# Patient Record
Sex: FEMALE | Race: WHITE | NOT HISPANIC OR LATINO | Employment: FULL TIME | URBAN - METROPOLITAN AREA
[De-identification: names, ages, dates, MRNs, and addresses within clinical notes are randomized per-mention and may not be internally consistent; named-entity substitution may affect disease eponyms.]

---

## 2024-03-09 ENCOUNTER — HOSPITAL ENCOUNTER (EMERGENCY)
Facility: HOSPITAL | Age: 49
Discharge: HOME | End: 2024-03-09
Attending: STUDENT IN AN ORGANIZED HEALTH CARE EDUCATION/TRAINING PROGRAM
Payer: COMMERCIAL

## 2024-03-09 VITALS
HEART RATE: 77 BPM | HEIGHT: 64 IN | WEIGHT: 269.84 LBS | OXYGEN SATURATION: 97 % | TEMPERATURE: 98.2 F | DIASTOLIC BLOOD PRESSURE: 101 MMHG | SYSTOLIC BLOOD PRESSURE: 191 MMHG | BODY MASS INDEX: 46.07 KG/M2 | RESPIRATION RATE: 18 BRPM

## 2024-03-09 DIAGNOSIS — M54.30 SCIATIC LEG PAIN: Primary | ICD-10-CM

## 2024-03-09 PROCEDURE — 99283 EMERGENCY DEPT VISIT LOW MDM: CPT

## 2024-03-09 PROCEDURE — 2500000002 HC RX 250 W HCPCS SELF ADMINISTERED DRUGS (ALT 637 FOR MEDICARE OP, ALT 636 FOR OP/ED): Performed by: STUDENT IN AN ORGANIZED HEALTH CARE EDUCATION/TRAINING PROGRAM

## 2024-03-09 PROCEDURE — 2500000004 HC RX 250 GENERAL PHARMACY W/ HCPCS (ALT 636 FOR OP/ED): Performed by: STUDENT IN AN ORGANIZED HEALTH CARE EDUCATION/TRAINING PROGRAM

## 2024-03-09 RX ORDER — PREDNISONE 50 MG/1
50 TABLET ORAL ONCE
Status: COMPLETED | OUTPATIENT
Start: 2024-03-09 | End: 2024-03-09

## 2024-03-09 RX ORDER — ORPHENADRINE CITRATE 100 MG/1
100 TABLET, EXTENDED RELEASE ORAL ONCE
Status: COMPLETED | OUTPATIENT
Start: 2024-03-09 | End: 2024-03-09

## 2024-03-09 RX ORDER — PREDNISONE 20 MG/1
20 TABLET ORAL DAILY
Qty: 3 TABLET | Refills: 0 | Status: SHIPPED | OUTPATIENT
Start: 2024-03-09 | End: 2024-03-12

## 2024-03-09 RX ORDER — ORPHENADRINE CITRATE 100 MG/1
100 TABLET, EXTENDED RELEASE ORAL 2 TIMES DAILY PRN
Qty: 6 TABLET | Refills: 0 | Status: SHIPPED | OUTPATIENT
Start: 2024-03-09

## 2024-03-09 RX ADMIN — ORPHENADRINE CITRATE 100 MG: 100 TABLET, EXTENDED RELEASE ORAL at 08:29

## 2024-03-09 RX ADMIN — PREDNISONE 50 MG: 50 TABLET ORAL at 08:29

## 2024-03-09 ASSESSMENT — PAIN DESCRIPTION - ORIENTATION: ORIENTATION: RIGHT

## 2024-03-09 ASSESSMENT — PAIN DESCRIPTION - PAIN TYPE: TYPE: ACUTE PAIN

## 2024-03-09 ASSESSMENT — COLUMBIA-SUICIDE SEVERITY RATING SCALE - C-SSRS
6. HAVE YOU EVER DONE ANYTHING, STARTED TO DO ANYTHING, OR PREPARED TO DO ANYTHING TO END YOUR LIFE?: NO
1. IN THE PAST MONTH, HAVE YOU WISHED YOU WERE DEAD OR WISHED YOU COULD GO TO SLEEP AND NOT WAKE UP?: NO
2. HAVE YOU ACTUALLY HAD ANY THOUGHTS OF KILLING YOURSELF?: NO

## 2024-03-09 ASSESSMENT — PAIN DESCRIPTION - LOCATION: LOCATION: HIP

## 2024-03-09 ASSESSMENT — PAIN SCALES - GENERAL: PAINLEVEL_OUTOF10: 10 - WORST POSSIBLE PAIN

## 2024-03-09 ASSESSMENT — PAIN DESCRIPTION - FREQUENCY: FREQUENCY: CONSTANT/CONTINUOUS

## 2024-03-09 ASSESSMENT — PAIN DESCRIPTION - DESCRIPTORS: DESCRIPTORS: STABBING;NUMBNESS

## 2024-03-09 ASSESSMENT — PAIN DESCRIPTION - ONSET: ONSET: SUDDEN

## 2024-03-09 ASSESSMENT — PAIN - FUNCTIONAL ASSESSMENT: PAIN_FUNCTIONAL_ASSESSMENT: 0-10

## 2024-03-09 ASSESSMENT — PAIN DESCRIPTION - PROGRESSION: CLINICAL_PROGRESSION: GRADUALLY WORSENING

## 2024-03-09 NOTE — ED PROVIDER NOTES
HPI   Chief Complaint   Patient presents with    Hip Pain     On Wednesday I slept without a pillow between my legs and I woke up with rt hip pain I feel stabbing and numbness in my knee nothing is helping       Patient presents with right buttock pain.  She reports that the pain has been ongoing for the last 3 to 4 days and getting worse.  No inciting injuries.  The pain radiates down the entire right leg and sometimes is stabbing and burning in sensation.  Nothing like this is happened previously.  Patient does report that she has known bulging disc in her low back.  She has history of high blood pressure but is otherwise healthy.  She has been using Tylenol and ibuprofen.  No loss of control of bowels or bladder.  No fevers.                          [place] Coma Scale Score: 15                     Patient History   Past Medical History:   Diagnosis Date    Tobacco abuse counseling 01/24/2013    Tobacco abuse counseling     No past surgical history on file.  No family history on file.  Social History     Tobacco Use    Smoking status: Not on file    Smokeless tobacco: Not on file   Substance Use Topics    Alcohol use: Not on file    Drug use: Not on file       Physical Exam   ED Triage Vitals [03/09/24 0813]   Temperature Heart Rate Respirations BP   36.8 °C (98.2 °F) 77 18 (!) 191/101      Pulse Ox Temp Source Heart Rate Source Patient Position   97 % Oral Monitor Sitting      BP Location FiO2 (%)     Left arm --       Physical Exam  HENT:      Head: Normocephalic.   Musculoskeletal:      Comments: No midline spinal tenderness to palpation.   Skin:     General: Skin is warm.   Neurological:      Mental Status: She is alert.      Comments: Strength of hip extension, flexion, abduction, adduction intact.  Strength of knee flexion and extension intact.  Strength of ankle flexion and extension intact.  Strength of toe plantar flexion and dorsiflexion intact.  Sensation intact in bilateral lower extremities including  inner thigh.           ED Course & MDM   Diagnoses as of 03/09/24 0826   Sciatic leg pain       Medical Decision Making  Patient presents with symptoms felt to be secondary to sciatica.  My suspicion for cauda equina syndrome, epidural abscess, epidural hematoma, spinal cord compression is very low.  Patient given prescription for steroid and muscle relaxer and was advised to continue using Tylenol and ibuprofen.  Patient advised to follow-up with primary care physician.  Return precautions given for any worsening symptoms.  Parts of this chart were completed with dictation software, please excuse any errors in transcription.        Procedure  Procedures     Terry Posada MD  03/09/24 0830

## 2024-03-09 NOTE — DISCHARGE INSTRUCTIONS
You have been given a prescription for a steroid which will help with inflammation of the nerve in your leg.  You have also been given a prescription for muscle relaxer which may help with spasms or pain.  You should not drive vehicle or operate machinery if using muscle relaxers.  You should continue to use Tylenol and ibuprofen.  You should remain as active as possible.  You should follow-up with your primary care physician.    It is important to remember that your care does not end here and you must continue to monitor your condition closely. Please return to the emergency department for any worsening or concerning signs or symptoms as directed by our conversations and the discharge instructions. If you do not have a doctor please contact the referral number on your discharge instructions. Please contact any physician specialists provided in your discharge notes as it is very important to follow up with them regarding your condition. If you are unable to reach the physicians provided, please come back to the Emergency Department at any time.    Return to emergency room without delay for ANY new or worsening pains or for any other symptoms or concerns.  Return with worsening pains, nausea, vomiting, trouble breathing, palpitations, shortness of breath, inability to pass stool or urine, loss of control of stool or urine, any numbness or tingling (that is not normal for you), uncontrolled fevers, the passing of blood or other material in stool or urine, rashes, pains or for any other symptoms or concerns you may have.  You are always welcome to return to the ER at any time for any reason or for any other concerns you may have.

## 2024-03-17 ENCOUNTER — HOSPITAL ENCOUNTER (EMERGENCY)
Facility: HOSPITAL | Age: 49
Discharge: HOME | End: 2024-03-17
Payer: COMMERCIAL

## 2024-03-17 ENCOUNTER — APPOINTMENT (OUTPATIENT)
Dept: RADIOLOGY | Facility: HOSPITAL | Age: 49
End: 2024-03-17
Payer: COMMERCIAL

## 2024-03-17 VITALS
HEIGHT: 64 IN | TEMPERATURE: 97.9 F | SYSTOLIC BLOOD PRESSURE: 167 MMHG | OXYGEN SATURATION: 96 % | HEART RATE: 78 BPM | DIASTOLIC BLOOD PRESSURE: 80 MMHG | BODY MASS INDEX: 46.18 KG/M2 | WEIGHT: 270.5 LBS | RESPIRATION RATE: 16 BRPM

## 2024-03-17 DIAGNOSIS — S92.501A CLOSED FRACTURE OF PHALANX OF RIGHT FOURTH TOE, INITIAL ENCOUNTER: ICD-10-CM

## 2024-03-17 DIAGNOSIS — S92.501A CLOSED FRACTURE OF FIFTH TOE OF RIGHT FOOT, INITIAL ENCOUNTER: Primary | ICD-10-CM

## 2024-03-17 PROCEDURE — 99283 EMERGENCY DEPT VISIT LOW MDM: CPT

## 2024-03-17 PROCEDURE — 2500000001 HC RX 250 WO HCPCS SELF ADMINISTERED DRUGS (ALT 637 FOR MEDICARE OP)

## 2024-03-17 PROCEDURE — 73630 X-RAY EXAM OF FOOT: CPT | Mod: RT

## 2024-03-17 PROCEDURE — 28515 TREATMENT OF TOE FRACTURE: CPT

## 2024-03-17 PROCEDURE — 73630 X-RAY EXAM OF FOOT: CPT | Mod: RIGHT SIDE | Performed by: RADIOLOGY

## 2024-03-17 RX ORDER — HYDROCODONE BITARTRATE AND ACETAMINOPHEN 5; 325 MG/1; MG/1
1 TABLET ORAL ONCE
Status: COMPLETED | OUTPATIENT
Start: 2024-03-17 | End: 2024-03-17

## 2024-03-17 RX ADMIN — HYDROCODONE BITARTRATE AND ACETAMINOPHEN 1 TABLET: 5; 325 TABLET ORAL at 20:42

## 2024-03-17 ASSESSMENT — PAIN DESCRIPTION - PROGRESSION: CLINICAL_PROGRESSION: NOT CHANGED

## 2024-03-17 ASSESSMENT — PAIN - FUNCTIONAL ASSESSMENT: PAIN_FUNCTIONAL_ASSESSMENT: 0-10

## 2024-03-17 ASSESSMENT — PAIN DESCRIPTION - ORIENTATION: ORIENTATION: RIGHT

## 2024-03-17 ASSESSMENT — COLUMBIA-SUICIDE SEVERITY RATING SCALE - C-SSRS
6. HAVE YOU EVER DONE ANYTHING, STARTED TO DO ANYTHING, OR PREPARED TO DO ANYTHING TO END YOUR LIFE?: NO
2. HAVE YOU ACTUALLY HAD ANY THOUGHTS OF KILLING YOURSELF?: NO
1. IN THE PAST MONTH, HAVE YOU WISHED YOU WERE DEAD OR WISHED YOU COULD GO TO SLEEP AND NOT WAKE UP?: NO

## 2024-03-17 ASSESSMENT — PAIN DESCRIPTION - ONSET: ONSET: SUDDEN

## 2024-03-17 ASSESSMENT — PAIN DESCRIPTION - FREQUENCY: FREQUENCY: CONSTANT/CONTINUOUS

## 2024-03-17 ASSESSMENT — PAIN DESCRIPTION - DESCRIPTORS: DESCRIPTORS: BURNING

## 2024-03-17 ASSESSMENT — PAIN DESCRIPTION - LOCATION: LOCATION: TOE (COMMENT WHICH ONE)

## 2024-03-17 ASSESSMENT — PAIN DESCRIPTION - PAIN TYPE: TYPE: ACUTE PAIN

## 2024-03-17 ASSESSMENT — PAIN SCALES - GENERAL: PAINLEVEL_OUTOF10: 9

## 2024-03-17 NOTE — Clinical Note
Estephania De was seen and treated in our emergency department on 3/17/2024.  She may return to work on 03/20/2024.  Patient to have seated job as necessary to help with healing of fracture.     If you have any questions or concerns, please don't hesitate to call.      Solange Spence PA-C

## 2024-03-18 NOTE — ED PROVIDER NOTES
HPI   Chief Complaint   Patient presents with    Toe Injury     Pt was walking at home and hit her right little toe against a door jam. Pt has deformity to toe.       Patient is a 48-year-old female presents emergency department for evaluation of right pinky toe injury.  Patient states that she was walking when she accidentally stubbed her pinky toe on a door jam.  She states all the pain is in the right pinky toe and there is an obvious deformity.  She has pain and tingling in the pinky toe, but denies any other injuries to the rest of the foot.  She has been walking on it, but has pain only in the pinky toe of the right foot.  She denies any pain in the foot or leg.  She denies any other injuries at this time.      History provided by:  Patient   used: No                        No data recorded                   Patient History   Past Medical History:   Diagnosis Date    Tobacco abuse counseling 01/24/2013    Tobacco abuse counseling     No past surgical history on file.  No family history on file.  Social History     Tobacco Use    Smoking status: Not on file    Smokeless tobacco: Not on file   Substance Use Topics    Alcohol use: Not on file    Drug use: Not on file       Physical Exam   ED Triage Vitals [03/17/24 2006]   Temperature Heart Rate Respirations BP   36.6 °C (97.9 °F) 78 16 167/80      Pulse Ox Temp Source Heart Rate Source Patient Position   96 % Temporal Monitor Sitting      BP Location FiO2 (%)     Right arm --       Physical Exam  Constitutional:       Appearance: Normal appearance.   Cardiovascular:      Rate and Rhythm: Normal rate and regular rhythm.   Pulmonary:      Effort: Pulmonary effort is normal.      Breath sounds: Normal breath sounds.   Musculoskeletal:         General: Tenderness and signs of injury present.      Comments: Tenderness to palpation over fourth and fifth toes of right foot with obvious deformity to fifth pinky with lateral angulation.  Decreased  range of motion to fourth and fifth toes because of pain.  Good capillary refill to all digits of right foot with no tenderness to palpation over metatarsals or ankle.   Skin:     General: Skin is warm and dry.   Neurological:      General: No focal deficit present.      Mental Status: She is alert and oriented to person, place, and time.         ED Course & MDM   Diagnoses as of 03/17/24 2150   Closed fracture of fifth toe of right foot, initial encounter   Closed fracture of phalanx of right fourth toe, initial encounter       Medical Decision Making  Patient is a 48-year-old female presents emergency department for evaluation of injury to right pinky and fourth toe.    Lab work not warranted at today's visit     scans done today were interpreted/confirmed by radiologist and also interpreted by me which included x-ray right foot.  X-ray shows oblique laterally angulated mildly displaced fracture of the proximal fifth phalanx with no intra-articular extension with possible transverse nondisplaced fracture of distal aspect of fourth proximal phalanx without evidence for intra-articular extension.    Medications given at today's visit include PO [place]    I saw this patient independently.  X-rays show evidence of fracture to both fourth and fifth proximal phalanx to right foot.  Fourth toe is nondisplaced with fifth toe showing some lateral angulation.  Because of the angulation and deformity, I did inject toe with lidocaine 1% and performed a reduction and placed the toe in better alignment.  Fifth phalanx was neurovascular intact following reduction and had better external alignment and was ally taped to the adjoining 4 toes.  She was placed in postop shoe by me and neurovascular intact following this.  She was educated on continued symptom management and close follow-up with podiatry for continued management of these fractures.  Patient agreeable to plan moving forward and agreeable to discharge at this time.   Emergent pathologies were considered for this patient, although I have low suspicion for anything acutely emergent given patient's clinical presentation, history, physical exam, stable vital signs, and relatively unremarkable workup.  Discharging patient home is reasonable plan of care for outpatient management.    All labs, imaging, and diagnostic studies were reviewed by me and patient was counseled on clinical impression, expectations, and plan.  Patient was educated to follow-up with PCP in the following 1-2 days.  All questions from patient were answered. They elicited understanding and were agreeable to course of treatment.  Patient was discharged in stable condition and given strict return precautions.    ** Disclaimer:  Parts of this document were written utilizing a voice to text dictation software.  Note may contain minor transcription or typographical errors that were inadvertently transcribed by the computer software.        Procedure  Orthopaedic Injury Treatment - Fracture    Performed by: Solange Spence PA-C  Authorized by: Solange Spence PA-C    Consent:     Consent obtained:  Verbal    Consent given by:  Patient    Risks, benefits, and alternatives were discussed: yes    Universal protocol:     Patient identity confirmed:  Verbally with patient  Injury:     Injury location:  Toe    Toe injury location:  R fifth toe    Toe fracture type: proximal phalanx    Pre-procedure details:     Distal neurologic exam:  Normal    Distal perfusion: brisk capillary refill    Sedation:     Sedation type:  None  Anesthesia:     Anesthesia method:  Local infiltration    Local anesthetic:  Lidocaine 1% w/o epi  Procedure details:     Manipulation performed: yes      Reduction successful: yes      X-ray confirmed reduction: no      Immobilization:  Tape    Additional details:  Visualized appropriate alignment of fifth toe with no longer having lateral angulation.  Post-procedure details:     Distal neurologic exam:   Normal    Distal perfusion: brisk capillary refill      Range of motion: improved      Procedure completion:  Tolerated well, no immediate complications       Solange Spence PA-C  03/17/24 1297

## 2024-03-18 NOTE — ED NOTES
Post op shoe applied to right foot. MPS intact. Patient denies pain at this time.      Kimberley Rothman RN  03/17/24 1773

## 2024-03-18 NOTE — DISCHARGE INSTRUCTIONS
Keep toes buddy taped and continue wearing postop shoe to follow-up closely with foot specialist as soon as possible.    Continue Tylenol and ibuprofen as needed for aches and pains.  Keep foot elevated and continue icing over the next 24 to 48 hours to help with swelling and inflammation.    Follow close with primary care provider in the following 1 to 2 days.

## 2024-06-16 ENCOUNTER — APPOINTMENT (OUTPATIENT)
Dept: RADIOLOGY | Facility: HOSPITAL | Age: 49
End: 2024-06-16
Payer: COMMERCIAL

## 2024-06-16 ENCOUNTER — HOSPITAL ENCOUNTER (OUTPATIENT)
Facility: HOSPITAL | Age: 49
Setting detail: OBSERVATION
Discharge: HOME | End: 2024-06-17
Attending: EMERGENCY MEDICINE | Admitting: INTERNAL MEDICINE
Payer: COMMERCIAL

## 2024-06-16 ENCOUNTER — APPOINTMENT (OUTPATIENT)
Dept: CARDIOLOGY | Facility: HOSPITAL | Age: 49
End: 2024-06-16
Payer: COMMERCIAL

## 2024-06-16 DIAGNOSIS — R27.0 ATAXIA: ICD-10-CM

## 2024-06-16 DIAGNOSIS — R42 VERTIGO: ICD-10-CM

## 2024-06-16 DIAGNOSIS — R11.2 NAUSEA AND VOMITING, UNSPECIFIED VOMITING TYPE: Primary | ICD-10-CM

## 2024-06-16 PROBLEM — I10 HTN (HYPERTENSION): Status: ACTIVE | Noted: 2024-06-16

## 2024-06-16 LAB
ALBUMIN SERPL-MCNC: 4.5 G/DL (ref 3.5–5)
ALP BLD-CCNC: 64 U/L (ref 35–125)
ALT SERPL-CCNC: 23 U/L (ref 5–40)
ANION GAP SERPL CALC-SCNC: 12 MMOL/L
APPEARANCE UR: CLEAR
AST SERPL-CCNC: 22 U/L (ref 5–40)
BASOPHILS # BLD AUTO: 0.05 X10*3/UL (ref 0–0.1)
BASOPHILS NFR BLD AUTO: 0.5 %
BILIRUB SERPL-MCNC: <0.2 MG/DL (ref 0.1–1.2)
BILIRUB UR STRIP.AUTO-MCNC: NEGATIVE MG/DL
BUN SERPL-MCNC: 13 MG/DL (ref 8–25)
CALCIUM SERPL-MCNC: 9.5 MG/DL (ref 8.5–10.4)
CHLORIDE SERPL-SCNC: 102 MMOL/L (ref 97–107)
CO2 SERPL-SCNC: 24 MMOL/L (ref 24–31)
COLOR UR: COLORLESS
CREAT SERPL-MCNC: 0.7 MG/DL (ref 0.4–1.6)
EGFRCR SERPLBLD CKD-EPI 2021: >90 ML/MIN/1.73M*2
EOSINOPHIL # BLD AUTO: 0.13 X10*3/UL (ref 0–0.7)
EOSINOPHIL NFR BLD AUTO: 1.2 %
ERYTHROCYTE [DISTWIDTH] IN BLOOD BY AUTOMATED COUNT: 12.5 % (ref 11.5–14.5)
GLUCOSE SERPL-MCNC: 128 MG/DL (ref 65–99)
GLUCOSE UR STRIP.AUTO-MCNC: NORMAL MG/DL
HCG UR QL IA.RAPID: NEGATIVE
HCT VFR BLD AUTO: 42.6 % (ref 36–46)
HGB BLD-MCNC: 14 G/DL (ref 12–16)
IMM GRANULOCYTES # BLD AUTO: 0.03 X10*3/UL (ref 0–0.7)
IMM GRANULOCYTES NFR BLD AUTO: 0.3 % (ref 0–0.9)
KETONES UR STRIP.AUTO-MCNC: NEGATIVE MG/DL
LEUKOCYTE ESTERASE UR QL STRIP.AUTO: NEGATIVE
LYMPHOCYTES # BLD AUTO: 4.88 X10*3/UL (ref 1.2–4.8)
LYMPHOCYTES NFR BLD AUTO: 45.9 %
MCH RBC QN AUTO: 29.4 PG (ref 26–34)
MCHC RBC AUTO-ENTMCNC: 32.9 G/DL (ref 32–36)
MCV RBC AUTO: 89 FL (ref 80–100)
MONOCYTES # BLD AUTO: 1.09 X10*3/UL (ref 0.1–1)
MONOCYTES NFR BLD AUTO: 10.2 %
NEUTROPHILS # BLD AUTO: 4.46 X10*3/UL (ref 1.2–7.7)
NEUTROPHILS NFR BLD AUTO: 41.9 %
NITRITE UR QL STRIP.AUTO: NEGATIVE
NRBC BLD-RTO: 0 /100 WBCS (ref 0–0)
PH UR STRIP.AUTO: 7 [PH]
PLATELET # BLD AUTO: 267 X10*3/UL (ref 150–450)
POTASSIUM SERPL-SCNC: 3.4 MMOL/L (ref 3.4–5.1)
PROT SERPL-MCNC: 7.4 G/DL (ref 5.9–7.9)
PROT UR STRIP.AUTO-MCNC: NEGATIVE MG/DL
RBC # BLD AUTO: 4.77 X10*6/UL (ref 4–5.2)
RBC # UR STRIP.AUTO: NEGATIVE /UL
SODIUM SERPL-SCNC: 138 MMOL/L (ref 133–145)
SP GR UR STRIP.AUTO: 1.02
TROPONIN T SERPL-MCNC: <6 NG/L
TROPONIN T SERPL-MCNC: <6 NG/L
UROBILINOGEN UR STRIP.AUTO-MCNC: NORMAL MG/DL
WBC # BLD AUTO: 10.6 X10*3/UL (ref 4.4–11.3)

## 2024-06-16 PROCEDURE — 36415 COLL VENOUS BLD VENIPUNCTURE: CPT | Performed by: PHYSICIAN ASSISTANT

## 2024-06-16 PROCEDURE — 71046 X-RAY EXAM CHEST 2 VIEWS: CPT | Performed by: SURGERY

## 2024-06-16 PROCEDURE — 2500000002 HC RX 250 W HCPCS SELF ADMINISTERED DRUGS (ALT 637 FOR MEDICARE OP, ALT 636 FOR OP/ED): Performed by: EMERGENCY MEDICINE

## 2024-06-16 PROCEDURE — 71046 X-RAY EXAM CHEST 2 VIEWS: CPT

## 2024-06-16 PROCEDURE — 81025 URINE PREGNANCY TEST: CPT | Performed by: EMERGENCY MEDICINE

## 2024-06-16 PROCEDURE — 85025 COMPLETE CBC W/AUTO DIFF WBC: CPT | Performed by: PHYSICIAN ASSISTANT

## 2024-06-16 PROCEDURE — 70553 MRI BRAIN STEM W/O & W/DYE: CPT | Performed by: STUDENT IN AN ORGANIZED HEALTH CARE EDUCATION/TRAINING PROGRAM

## 2024-06-16 PROCEDURE — 70450 CT HEAD/BRAIN W/O DYE: CPT

## 2024-06-16 PROCEDURE — 84484 ASSAY OF TROPONIN QUANT: CPT | Performed by: PHYSICIAN ASSISTANT

## 2024-06-16 PROCEDURE — 2500000001 HC RX 250 WO HCPCS SELF ADMINISTERED DRUGS (ALT 637 FOR MEDICARE OP): Performed by: PHYSICIAN ASSISTANT

## 2024-06-16 PROCEDURE — 81003 URINALYSIS AUTO W/O SCOPE: CPT | Performed by: PHYSICIAN ASSISTANT

## 2024-06-16 PROCEDURE — 96374 THER/PROPH/DIAG INJ IV PUSH: CPT | Mod: 59

## 2024-06-16 PROCEDURE — A9575 INJ GADOTERATE MEGLUMI 0.1ML: HCPCS | Performed by: PHYSICIAN ASSISTANT

## 2024-06-16 PROCEDURE — 93005 ELECTROCARDIOGRAM TRACING: CPT

## 2024-06-16 PROCEDURE — 70450 CT HEAD/BRAIN W/O DYE: CPT | Performed by: SURGERY

## 2024-06-16 PROCEDURE — 99285 EMERGENCY DEPT VISIT HI MDM: CPT | Mod: 25

## 2024-06-16 PROCEDURE — G0378 HOSPITAL OBSERVATION PER HR: HCPCS

## 2024-06-16 PROCEDURE — 93010 ELECTROCARDIOGRAM REPORT: CPT | Performed by: INTERNAL MEDICINE

## 2024-06-16 PROCEDURE — 2500000004 HC RX 250 GENERAL PHARMACY W/ HCPCS (ALT 636 FOR OP/ED): Performed by: INTERNAL MEDICINE

## 2024-06-16 PROCEDURE — 80053 COMPREHEN METABOLIC PANEL: CPT | Performed by: PHYSICIAN ASSISTANT

## 2024-06-16 PROCEDURE — 96361 HYDRATE IV INFUSION ADD-ON: CPT

## 2024-06-16 PROCEDURE — 96372 THER/PROPH/DIAG INJ SC/IM: CPT | Mod: 59 | Performed by: INTERNAL MEDICINE

## 2024-06-16 PROCEDURE — 96375 TX/PRO/DX INJ NEW DRUG ADDON: CPT | Mod: 59

## 2024-06-16 PROCEDURE — 2500000004 HC RX 250 GENERAL PHARMACY W/ HCPCS (ALT 636 FOR OP/ED): Performed by: PHYSICIAN ASSISTANT

## 2024-06-16 PROCEDURE — 70553 MRI BRAIN STEM W/O & W/DYE: CPT

## 2024-06-16 RX ORDER — ACETAMINOPHEN 650 MG/1
650 SUPPOSITORY RECTAL EVERY 4 HOURS PRN
Status: DISCONTINUED | OUTPATIENT
Start: 2024-06-16 | End: 2024-06-17 | Stop reason: HOSPADM

## 2024-06-16 RX ORDER — ACETAMINOPHEN 325 MG/1
650 TABLET ORAL EVERY 4 HOURS PRN
Status: DISCONTINUED | OUTPATIENT
Start: 2024-06-16 | End: 2024-06-17 | Stop reason: HOSPADM

## 2024-06-16 RX ORDER — MECLIZINE HYDROCHLORIDE 25 MG/1
25 TABLET ORAL ONCE
Status: COMPLETED | OUTPATIENT
Start: 2024-06-16 | End: 2024-06-16

## 2024-06-16 RX ORDER — GUAIFENESIN/DEXTROMETHORPHAN 100-10MG/5
5 SYRUP ORAL EVERY 4 HOURS PRN
Status: DISCONTINUED | OUTPATIENT
Start: 2024-06-16 | End: 2024-06-17 | Stop reason: HOSPADM

## 2024-06-16 RX ORDER — AMLODIPINE BESYLATE 5 MG/1
5 TABLET ORAL DAILY
Status: DISCONTINUED | OUTPATIENT
Start: 2024-06-16 | End: 2024-06-17

## 2024-06-16 RX ORDER — GADOTERATE MEGLUMINE 376.9 MG/ML
20 INJECTION INTRAVENOUS
Status: COMPLETED | OUTPATIENT
Start: 2024-06-16 | End: 2024-06-16

## 2024-06-16 RX ORDER — SODIUM CHLORIDE 9 MG/ML
100 INJECTION, SOLUTION INTRAVENOUS CONTINUOUS
Status: DISCONTINUED | OUTPATIENT
Start: 2024-06-16 | End: 2024-06-17

## 2024-06-16 RX ORDER — ONDANSETRON HYDROCHLORIDE 2 MG/ML
4 INJECTION, SOLUTION INTRAVENOUS ONCE
Status: COMPLETED | OUTPATIENT
Start: 2024-06-16 | End: 2024-06-16

## 2024-06-16 RX ORDER — POLYETHYLENE GLYCOL 3350 17 G/17G
17 POWDER, FOR SOLUTION ORAL DAILY PRN
Status: DISCONTINUED | OUTPATIENT
Start: 2024-06-16 | End: 2024-06-17 | Stop reason: HOSPADM

## 2024-06-16 RX ORDER — ENOXAPARIN SODIUM 100 MG/ML
40 INJECTION SUBCUTANEOUS EVERY 12 HOURS SCHEDULED
Status: DISCONTINUED | OUTPATIENT
Start: 2024-06-16 | End: 2024-06-17 | Stop reason: HOSPADM

## 2024-06-16 RX ORDER — DIAZEPAM 5 MG/1
5 TABLET ORAL ONCE
Status: COMPLETED | OUTPATIENT
Start: 2024-06-16 | End: 2024-06-16

## 2024-06-16 RX ORDER — GUAIFENESIN 600 MG/1
600 TABLET, EXTENDED RELEASE ORAL EVERY 12 HOURS PRN
Status: DISCONTINUED | OUTPATIENT
Start: 2024-06-16 | End: 2024-06-17 | Stop reason: HOSPADM

## 2024-06-16 RX ORDER — ONDANSETRON 4 MG/1
4 TABLET, ORALLY DISINTEGRATING ORAL EVERY 8 HOURS PRN
Status: DISCONTINUED | OUTPATIENT
Start: 2024-06-16 | End: 2024-06-17 | Stop reason: HOSPADM

## 2024-06-16 RX ORDER — PREDNISONE 20 MG/1
20 TABLET ORAL
Status: DISCONTINUED | OUTPATIENT
Start: 2024-06-17 | End: 2024-06-17

## 2024-06-16 RX ORDER — ACETAMINOPHEN 160 MG/5ML
650 SOLUTION ORAL EVERY 4 HOURS PRN
Status: DISCONTINUED | OUTPATIENT
Start: 2024-06-16 | End: 2024-06-17 | Stop reason: HOSPADM

## 2024-06-16 RX ORDER — ONDANSETRON HYDROCHLORIDE 2 MG/ML
4 INJECTION, SOLUTION INTRAVENOUS EVERY 8 HOURS PRN
Status: DISCONTINUED | OUTPATIENT
Start: 2024-06-16 | End: 2024-06-17 | Stop reason: HOSPADM

## 2024-06-16 RX ORDER — MECLIZINE HCL 25MG 25 MG/1
25 TABLET, CHEWABLE ORAL EVERY 8 HOURS PRN
Status: DISCONTINUED | OUTPATIENT
Start: 2024-06-16 | End: 2024-06-17 | Stop reason: RX

## 2024-06-16 RX ORDER — FAMOTIDINE 20 MG/1
20 TABLET, FILM COATED ORAL DAILY
Status: DISCONTINUED | OUTPATIENT
Start: 2024-06-17 | End: 2024-06-17 | Stop reason: HOSPADM

## 2024-06-16 RX ORDER — SCOLOPAMINE TRANSDERMAL SYSTEM 1 MG/1
1 PATCH, EXTENDED RELEASE TRANSDERMAL
Status: DISCONTINUED | OUTPATIENT
Start: 2024-06-16 | End: 2024-06-17 | Stop reason: HOSPADM

## 2024-06-16 SDOH — SOCIAL STABILITY: SOCIAL INSECURITY: ARE YOU OR HAVE YOU BEEN THREATENED OR ABUSED PHYSICALLY, EMOTIONALLY, OR SEXUALLY BY ANYONE?: NO

## 2024-06-16 SDOH — SOCIAL STABILITY: SOCIAL INSECURITY: HAVE YOU HAD THOUGHTS OF HARMING ANYONE ELSE?: NO

## 2024-06-16 SDOH — SOCIAL STABILITY: SOCIAL INSECURITY: ARE THERE ANY APPARENT SIGNS OF INJURIES/BEHAVIORS THAT COULD BE RELATED TO ABUSE/NEGLECT?: NO

## 2024-06-16 SDOH — SOCIAL STABILITY: SOCIAL INSECURITY: DO YOU FEEL ANYONE HAS EXPLOITED OR TAKEN ADVANTAGE OF YOU FINANCIALLY OR OF YOUR PERSONAL PROPERTY?: NO

## 2024-06-16 SDOH — SOCIAL STABILITY: SOCIAL INSECURITY: HAS ANYONE EVER THREATENED TO HURT YOUR FAMILY OR YOUR PETS?: NO

## 2024-06-16 SDOH — SOCIAL STABILITY: SOCIAL INSECURITY: HAVE YOU HAD ANY THOUGHTS OF HARMING ANYONE ELSE?: NO

## 2024-06-16 SDOH — SOCIAL STABILITY: SOCIAL INSECURITY: ABUSE: ADULT

## 2024-06-16 SDOH — SOCIAL STABILITY: SOCIAL INSECURITY: DO YOU FEEL UNSAFE GOING BACK TO THE PLACE WHERE YOU ARE LIVING?: NO

## 2024-06-16 SDOH — SOCIAL STABILITY: SOCIAL INSECURITY: DOES ANYONE TRY TO KEEP YOU FROM HAVING/CONTACTING OTHER FRIENDS OR DOING THINGS OUTSIDE YOUR HOME?: NO

## 2024-06-16 ASSESSMENT — ACTIVITIES OF DAILY LIVING (ADL)
FEEDING YOURSELF: INDEPENDENT
HEARING - LEFT EAR: FUNCTIONAL
ADEQUATE_TO_COMPLETE_ADL: YES
LACK_OF_TRANSPORTATION: NO
TOILETING: INDEPENDENT
WALKS IN HOME: NEEDS ASSISTANCE
PATIENT'S MEMORY ADEQUATE TO SAFELY COMPLETE DAILY ACTIVITIES?: YES
HEARING - RIGHT EAR: FUNCTIONAL
GROOMING: INDEPENDENT
DRESSING YOURSELF: INDEPENDENT
JUDGMENT_ADEQUATE_SAFELY_COMPLETE_DAILY_ACTIVITIES: YES
BATHING: INDEPENDENT

## 2024-06-16 ASSESSMENT — LIFESTYLE VARIABLES
HOW MANY STANDARD DRINKS CONTAINING ALCOHOL DO YOU HAVE ON A TYPICAL DAY: 1 OR 2
SKIP TO QUESTIONS 9-10: 1
SUBSTANCE_ABUSE_PAST_12_MONTHS: NO
AUDIT-C TOTAL SCORE: 1
HOW OFTEN DO YOU HAVE 6 OR MORE DRINKS ON ONE OCCASION: NEVER
PRESCIPTION_ABUSE_PAST_12_MONTHS: NO
HOW OFTEN DO YOU HAVE A DRINK CONTAINING ALCOHOL: MONTHLY OR LESS
AUDIT-C TOTAL SCORE: 1

## 2024-06-16 ASSESSMENT — COGNITIVE AND FUNCTIONAL STATUS - GENERAL
MOVING TO AND FROM BED TO CHAIR: A LITTLE
DAILY ACTIVITIY SCORE: 23
CLIMB 3 TO 5 STEPS WITH RAILING: A LITTLE
WALKING IN HOSPITAL ROOM: A LITTLE
PATIENT BASELINE BEDBOUND: NO
MOBILITY SCORE: 21
TOILETING: A LITTLE

## 2024-06-16 ASSESSMENT — PAIN SCALES - GENERAL
PAINLEVEL_OUTOF10: 0 - NO PAIN
PAINLEVEL_OUTOF10: 0 - NO PAIN
PAINLEVEL_OUTOF10: 5 - MODERATE PAIN

## 2024-06-16 ASSESSMENT — PAIN - FUNCTIONAL ASSESSMENT
PAIN_FUNCTIONAL_ASSESSMENT: 0-10

## 2024-06-16 ASSESSMENT — PATIENT HEALTH QUESTIONNAIRE - PHQ9
1. LITTLE INTEREST OR PLEASURE IN DOING THINGS: NOT AT ALL
SUM OF ALL RESPONSES TO PHQ9 QUESTIONS 1 & 2: 0
2. FEELING DOWN, DEPRESSED OR HOPELESS: NOT AT ALL

## 2024-06-16 NOTE — PROGRESS NOTES
Attestation/Supervisory note for DANA Phelps      The patient is a 49-year-old female presenting to the emergency department for evaluation of vertigo.  The patient reportedly was feeling okay this morning.  She was eating meals with her family.  She states that sometime around 1520 she started having some lightheadedness and generalized malaise.  She states that she was off balance and she felt like she was drunk but she had not been drinking alcohol.  She states that her head was full but she did not have a specific headache.  No neck or back pain.  No visual changes.  No chest pain or shortness of breath.  No abdominal pain.  She did have some nausea.  No diarrhea or constipation.  No urinary complaints.  No vaginal discharge.  No fever or chills.  No cough or congestion.  No sick contacts or recent travel.  No recent injury or trauma.  She did not have any difficulty swallowing or speaking.  She did not have any focal weakness or numbness.  All pertinent positives and negatives are recorded above.  All other systems reviewed and otherwise negative.  Vital signs with borderline hypertension but otherwise within normal limits.  Physical exam with a well-nourished well-developed female in no acute distress.  HEENT exam with dry mucous membranes but otherwise unremarkable.  She has no evidence of airway compromise or respiratory distress.  Abdominal exam is benign.  She has no gross motor, neurologic or vascular deficits on exam.  NIH stroke scale score of 0 at this time.      tPA/TNK is not indicated given current NIH stroke scale score of 0 and signs and symptoms to be most consistent with benign positional vertigo.      EKG with normal sinus rhythm at 94 bpm, normal axis, normal voltage, normal ST segment, normal T waves      Oral meclizine, IV Zofran, and IV fluids ordered.  IV Phenergan given for persistent nausea.  Oral Valium given for persistent vertigo/ataxia.      Diagnostic labs without any acute  abnormality.      Initial Troponin T < 6.  Repeat troponin T pending at the time of my departure       UA also pending at the time of my departure      CT head wo IV contrast   Final Result   No evidence of acute intracranial abnormality.        MACRO:   None        Signed by: Arturo Tony 6/16/2024 6:48 PM   Dictation workstation:   [id number]      XR chest 2 views   Final Result   1.  No evidence of acute cardiopulmonary process.                  MACRO:   None        Signed by: Arturo Tony 6/16/2024 6:47 PM   Dictation workstation:   [id number]      MR brain w and wo IV contrast    (Results Pending)        The patient does not have any gross motor, neurologic or vascular deficits on exam.  She has an NIH scale score of 0 but still felt like she was having difficulty with walking and standing.  tPA/TNK is not indicated given NIH stroke scale score of 0.  Chest x-ray without acute process.  No evidence of pneumonia or pneumothorax.  No evidence of CHF.  No widening of the mediastinum.  She does have equal pulses bilateral.  The CT head shows no acute process.  No evidence of intracranial hemorrhage, mass effect or CVA.  Due to the patient having persistent ataxia with ambulation, an MRI was ordered.      The results of the MRI, repeat pregnancy and UA were pending at the time of my departure.  Pt care turned over to Dr Faust at 2018 with anticipated discharge to home if the results of these diagnostic studies do not show indication for admission and/or transfer.      Impression/diagnosis:  1.  Vertigo/ataxia  2.  Hypertension, unspecified  3.  Nausea and vomiting      Critical care time of  17 minutes billed for assessment of the patient with NIH stroke scale, consideration of tPA/TNK inclusion and exclusion criteria, monitoring of the patient on telemetry and consultation with the patient regarding her results..  This time excludes time for billable procedures.      critical care time billed for by me is non  concurrent with time billed for by DANA Phelps      I personally saw the patient and made/approve the management plan and take responsibility for the patient management.      I independently interpreted the following study (S) EKG and diagnostic labs      I personally discussed the patient's management with the patient    I reviewed the results of the diagnostic labs and diagnostic imaging.  Formal radiology read was completed by the radiologist.      Amrita Moya MD

## 2024-06-16 NOTE — ED PROVIDER NOTES
HPI   Chief Complaint   Patient presents with    Nausea     Pt stated that she has a hx of migraines, got dizzy and became nauseous, she started vomiting en route.        HPI     Is a 49-year-old female with a history of high blood pressure presenting for evaluation of dizziness with nausea and vomiting.  Patient states that she began feeling dizzy approximately 30 minutes prior to arrival.  Patient states that she was standing when she suddenly felt a room spinning sensation and became nauseous.  Patient started vomiting en route via EMS.  She denies history of TIA or CVA.  Denies all other pertinent history aside from hypertension.  She denies focal weakness or numbness.  No trauma to the head.  She states that opening her eyes and moving her head makes the symptoms worse.  Sitting still and closing her eyes makes the symptoms better.  Denies chest pain or shortness of breath.  No abdominal pain.  No diarrhea or constipation.  No dysuria or hematuria.               [place] Coma Scale Score: 15                     Patient History   Past Medical History:   Diagnosis Date    Tobacco abuse counseling 01/24/2013    Tobacco abuse counseling     No past surgical history on file.  No family history on file.  Social History     Tobacco Use    Smoking status: Unknown    Smokeless tobacco: Not on file   Substance Use Topics    Alcohol use: Not on file    Drug use: Not on file       Physical Exam   ED Triage Vitals [06/16/24 1608]   Temperature Heart Rate Respirations BP   36.7 °C (98.1 °F) 92 18 --      Pulse Ox Temp Source Heart Rate Source Patient Position   95 % Oral Monitor Sitting      BP Location FiO2 (%)     Right arm --       Physical Exam  Vitals and nursing note reviewed.   Constitutional:       Appearance: Normal appearance.   HENT:      Head: Normocephalic and atraumatic.   Eyes:      Extraocular Movements: Extraocular movements intact.      Conjunctiva/sclera: Conjunctivae normal.      Pupils: Pupils are equal,  round, and reactive to light.   Cardiovascular:      Rate and Rhythm: Normal rate and regular rhythm.   Pulmonary:      Effort: Pulmonary effort is normal.      Breath sounds: Normal breath sounds.   Abdominal:      General: Abdomen is flat. Bowel sounds are normal.      Palpations: Abdomen is soft.   Musculoskeletal:         General: Normal range of motion.      Cervical back: Normal range of motion and neck supple.   Neurological:      Mental Status: She is alert.      Comments: NIH of 0.  Test of skew negative.  Romberg negative.         ED Course & MDM        Medical Decision Making  Parts of this chart have been completed using voice recognition software. Please excuse any errors of transcription. Despite the medical decision making time stamp above-my medical decision making has taken place during the patient's entire visit. My thought process and reason for plan has been formulated from the time that I saw the patient until the time of disposition and is not specific to one specific moment during their visit and furthermore my MDM encompasses this entire chart and not only this text box.      HPI: Detailed above.    Exam: A medically appropriate exam performed, outlined above, given the known history and presentation.    History obtained from: Patient    Social Determinants of Health considered during this visit: Coming from home    EKG interpreted by my attending physician, reviewed by myself.    Labs Reviewed   URINALYSIS WITH REFLEX CULTURE AND MICROSCOPIC    Narrative:     The following orders were created for panel order Urinalysis with Reflex Culture and Microscopic.  Procedure                               Abnormality         Status                     ---------                               -----------         ------                     Urinalysis with Reflex C...[[id number]]                                                 Extra Urine Gray Tube[[id number]]                                                          Please view results for these tests on the individual orders.   CBC WITH AUTO DIFFERENTIAL   COMPREHENSIVE METABOLIC PANEL   TROPONIN T SERIES, HIGH SENSITIVITY (0, 2 HR, 6 HR)    Narrative:     The following orders were created for panel order Troponin T Series, High Sensitivity (0, 2HR, 6HR).  Procedure                               Abnormality         Status                     ---------                               -----------         ------                     Serial Troponin, Initial...[[id number]]                                                 Serial Troponin, 2 Hour ...[[id number]]                                                   Please view results for these tests on the individual orders.   URINALYSIS WITH REFLEX CULTURE AND MICROSCOPIC   EXTRA URINE GRAY TUBE   SERIAL TROPONIN, INITIAL ([facility])   SERIAL TROPONIN,  2 HOUR ([facility])     XR chest 2 views    (Results Pending)   CT head wo IV contrast    (Results Pending)     Medications   sodium chloride 0.9 % bolus 1,000 mL (has no administration in time range)   meclizine (Antivert) tablet 25 mg (has no administration in time range)   ondansetron (Zofran) injection 4 mg (has no administration in time range)     Differential diagnoses considered for this visit include: Central vertigo versus peripheral vertigo versus dehydration    Considerations/further MDM:    Patient is a 49-year-old female with a history of hypertension presenting for evaluation of dizziness with nausea and vomiting.  Vital signs demonstrate mild hypertension but otherwise within normal limits.  Physical examination demonstrated a well-nourished well-developed obese female in no acute distress.  She does appear uncomfortable, lying in bed with a towel over her eyes and face.  She states that her symptoms get worse when she does move her eyes or move her head.  Workup was initiated with additional CT head without contrast for diagnostic imaging.  Fluids were ordered with meclizine and  Zofran.  CBC and CMP were within normal limits.  Initial troponin of 6.     Patient was still feeling persistently dizzy with nausea, and Phenergan was ordered for attempted nausea relief. CT head without contrast was negative for evidence of acute intracranial abnormality.  Chest x-ray was negative for acute cardiopulmonary process.    8:00pm -patient was handed off to my attending physician Dr. Micheal Faust at the time of my departure.  See his supervisory note and further documentation for final disposition.    Patient was seen in conjunction with attending physician Dr. Amrita Moya.   Patient's history, physical exam, diagnostic studies, and treatment plan were discussed thoroughly.    Procedure  Procedures     Codi Phelps PA-C  06/16/24 2003       Codi Phelps PA-C  06/16/24 2017

## 2024-06-17 ENCOUNTER — PHARMACY VISIT (OUTPATIENT)
Dept: PHARMACY | Facility: CLINIC | Age: 49
End: 2024-06-17
Payer: MEDICARE

## 2024-06-17 VITALS
SYSTOLIC BLOOD PRESSURE: 141 MMHG | HEART RATE: 72 BPM | HEIGHT: 64 IN | OXYGEN SATURATION: 98 % | WEIGHT: 293 LBS | RESPIRATION RATE: 18 BRPM | BODY MASS INDEX: 50.02 KG/M2 | DIASTOLIC BLOOD PRESSURE: 74 MMHG | TEMPERATURE: 98.6 F

## 2024-06-17 LAB
ATRIAL RATE: 94 BPM
EST. AVERAGE GLUCOSE BLD GHB EST-MCNC: 111 MG/DL
HBA1C MFR BLD: 5.5 %
P AXIS: 40 DEGREES
P OFFSET: 201 MS
P ONSET: 144 MS
PR INTERVAL: 152 MS
Q ONSET: 220 MS
QRS COUNT: 16 BEATS
QRS DURATION: 84 MS
QT INTERVAL: 388 MS
QTC CALCULATION(BAZETT): 485 MS
QTC FREDERICIA: 450 MS
R AXIS: 23 DEGREES
T AXIS: -6 DEGREES
T OFFSET: 414 MS
VENTRICULAR RATE: 94 BPM

## 2024-06-17 PROCEDURE — G0378 HOSPITAL OBSERVATION PER HR: HCPCS

## 2024-06-17 PROCEDURE — 2500000004 HC RX 250 GENERAL PHARMACY W/ HCPCS (ALT 636 FOR OP/ED): Performed by: NURSE PRACTITIONER

## 2024-06-17 PROCEDURE — 36415 COLL VENOUS BLD VENIPUNCTURE: CPT | Performed by: INTERNAL MEDICINE

## 2024-06-17 PROCEDURE — 96374 THER/PROPH/DIAG INJ IV PUSH: CPT | Mod: 59

## 2024-06-17 PROCEDURE — 96372 THER/PROPH/DIAG INJ SC/IM: CPT | Performed by: INTERNAL MEDICINE

## 2024-06-17 PROCEDURE — 97165 OT EVAL LOW COMPLEX 30 MIN: CPT | Mod: GO

## 2024-06-17 PROCEDURE — 83036 HEMOGLOBIN GLYCOSYLATED A1C: CPT | Performed by: INTERNAL MEDICINE

## 2024-06-17 PROCEDURE — RXMED WILLOW AMBULATORY MEDICATION CHARGE

## 2024-06-17 PROCEDURE — 2500000004 HC RX 250 GENERAL PHARMACY W/ HCPCS (ALT 636 FOR OP/ED): Performed by: INTERNAL MEDICINE

## 2024-06-17 PROCEDURE — 2500000001 HC RX 250 WO HCPCS SELF ADMINISTERED DRUGS (ALT 637 FOR MEDICARE OP): Performed by: INTERNAL MEDICINE

## 2024-06-17 RX ORDER — LISINOPRIL 10 MG/1
10 TABLET ORAL DAILY
Status: DISCONTINUED | OUTPATIENT
Start: 2024-06-17 | End: 2024-06-17 | Stop reason: HOSPADM

## 2024-06-17 RX ORDER — LISINOPRIL 10 MG/1
10 TABLET ORAL DAILY
Status: DISCONTINUED | OUTPATIENT
Start: 2024-06-17 | End: 2024-06-17

## 2024-06-17 RX ORDER — ONDANSETRON 4 MG/1
4 TABLET, ORALLY DISINTEGRATING ORAL EVERY 8 HOURS PRN
Qty: 12 TABLET | Refills: 0 | Status: SHIPPED | OUTPATIENT
Start: 2024-06-17

## 2024-06-17 RX ORDER — MECLIZINE HYDROCHLORIDE 25 MG/1
25 TABLET ORAL 3 TIMES DAILY PRN
Qty: 12 TABLET | Refills: 0 | Status: SHIPPED | OUTPATIENT
Start: 2024-06-17

## 2024-06-17 RX ORDER — MECLIZINE HYDROCHLORIDE 25 MG/1
25 TABLET ORAL 3 TIMES DAILY PRN
Status: DISCONTINUED | OUTPATIENT
Start: 2024-06-17 | End: 2024-06-17 | Stop reason: HOSPADM

## 2024-06-17 RX ORDER — PREDNISONE 20 MG/1
40 TABLET ORAL DAILY
Qty: 6 TABLET | Refills: 0 | Status: SHIPPED | OUTPATIENT
Start: 2024-06-17 | End: 2024-06-20

## 2024-06-17 RX ORDER — LISINOPRIL 10 MG/1
10 TABLET ORAL DAILY
COMMUNITY

## 2024-06-17 SDOH — ECONOMIC STABILITY: FOOD INSECURITY: WITHIN THE PAST 12 MONTHS, THE FOOD YOU BOUGHT JUST DIDN'T LAST AND YOU DIDN'T HAVE MONEY TO GET MORE.: NEVER TRUE

## 2024-06-17 SDOH — ECONOMIC STABILITY: TRANSPORTATION INSECURITY
IN THE PAST 12 MONTHS, HAS THE LACK OF TRANSPORTATION KEPT YOU FROM MEDICAL APPOINTMENTS OR FROM GETTING MEDICATIONS?: NO

## 2024-06-17 SDOH — HEALTH STABILITY: MENTAL HEALTH
STRESS IS WHEN SOMEONE FEELS TENSE, NERVOUS, ANXIOUS, OR CAN'T SLEEP AT NIGHT BECAUSE THEIR MIND IS TROUBLED. HOW STRESSED ARE YOU?: NOT AT ALL

## 2024-06-17 SDOH — HEALTH STABILITY: MENTAL HEALTH: HOW MANY STANDARD DRINKS CONTAINING ALCOHOL DO YOU HAVE ON A TYPICAL DAY?: PATIENT DOES NOT DRINK

## 2024-06-17 SDOH — ECONOMIC STABILITY: HOUSING INSECURITY
IN THE LAST 12 MONTHS, WAS THERE A TIME WHEN YOU DID NOT HAVE A STEADY PLACE TO SLEEP OR SLEPT IN A SHELTER (INCLUDING NOW)?: NO

## 2024-06-17 SDOH — HEALTH STABILITY: MENTAL HEALTH: HOW OFTEN DO YOU HAVE A DRINK CONTAINING ALCOHOL?: NEVER

## 2024-06-17 SDOH — SOCIAL STABILITY: SOCIAL NETWORK: ARE YOU MARRIED, WIDOWED, DIVORCED, SEPARATED, NEVER MARRIED, OR LIVING WITH A PARTNER?: MARRIED

## 2024-06-17 SDOH — ECONOMIC STABILITY: INCOME INSECURITY: IN THE PAST 12 MONTHS, HAS THE ELECTRIC, GAS, OIL, OR WATER COMPANY THREATENED TO SHUT OFF SERVICE IN YOUR HOME?: NO

## 2024-06-17 SDOH — SOCIAL STABILITY: SOCIAL INSECURITY: WITHIN THE LAST YEAR, HAVE YOU BEEN AFRAID OF YOUR PARTNER OR EX-PARTNER?: NO

## 2024-06-17 SDOH — SOCIAL STABILITY: SOCIAL NETWORK: HOW OFTEN DO YOU ATTEND CHURCH OR RELIGIOUS SERVICES?: NEVER

## 2024-06-17 SDOH — ECONOMIC STABILITY: HOUSING INSECURITY: IN THE LAST 12 MONTHS, HOW MANY PLACES HAVE YOU LIVED?: 1

## 2024-06-17 SDOH — ECONOMIC STABILITY: INCOME INSECURITY: HOW HARD IS IT FOR YOU TO PAY FOR THE VERY BASICS LIKE FOOD, HOUSING, MEDICAL CARE, AND HEATING?: NOT HARD AT ALL

## 2024-06-17 SDOH — ECONOMIC STABILITY: FOOD INSECURITY: WITHIN THE PAST 12 MONTHS, YOU WORRIED THAT YOUR FOOD WOULD RUN OUT BEFORE YOU GOT MONEY TO BUY MORE.: NEVER TRUE

## 2024-06-17 SDOH — HEALTH STABILITY: MENTAL HEALTH
HOW OFTEN DO YOU NEED TO HAVE SOMEONE HELP YOU WHEN YOU READ INSTRUCTIONS, PAMPHLETS, OR OTHER WRITTEN MATERIAL FROM YOUR DOCTOR OR PHARMACY?: NEVER

## 2024-06-17 SDOH — HEALTH STABILITY: PHYSICAL HEALTH: ON AVERAGE, HOW MANY DAYS PER WEEK DO YOU ENGAGE IN MODERATE TO STRENUOUS EXERCISE (LIKE A BRISK WALK)?: 0 DAYS

## 2024-06-17 SDOH — ECONOMIC STABILITY: INCOME INSECURITY: IN THE LAST 12 MONTHS, WAS THERE A TIME WHEN YOU WERE NOT ABLE TO PAY THE MORTGAGE OR RENT ON TIME?: NO

## 2024-06-17 SDOH — SOCIAL STABILITY: SOCIAL INSECURITY: WITHIN THE LAST YEAR, HAVE YOU BEEN HUMILIATED OR EMOTIONALLY ABUSED IN OTHER WAYS BY YOUR PARTNER OR EX-PARTNER?: NO

## 2024-06-17 SDOH — SOCIAL STABILITY: SOCIAL INSECURITY
WITHIN THE LAST YEAR, HAVE TO BEEN RAPED OR FORCED TO HAVE ANY KIND OF SEXUAL ACTIVITY BY YOUR PARTNER OR EX-PARTNER?: NO

## 2024-06-17 SDOH — SOCIAL STABILITY: SOCIAL INSECURITY
WITHIN THE LAST YEAR, HAVE YOU BEEN KICKED, HIT, SLAPPED, OR OTHERWISE PHYSICALLY HURT BY YOUR PARTNER OR EX-PARTNER?: NO

## 2024-06-17 SDOH — HEALTH STABILITY: PHYSICAL HEALTH: ON AVERAGE, HOW MANY MINUTES DO YOU ENGAGE IN EXERCISE AT THIS LEVEL?: 0 MIN

## 2024-06-17 SDOH — ECONOMIC STABILITY: TRANSPORTATION INSECURITY
IN THE PAST 12 MONTHS, HAS LACK OF TRANSPORTATION KEPT YOU FROM MEETINGS, WORK, OR FROM GETTING THINGS NEEDED FOR DAILY LIVING?: NO

## 2024-06-17 SDOH — SOCIAL STABILITY: SOCIAL NETWORK
DO YOU BELONG TO ANY CLUBS OR ORGANIZATIONS SUCH AS CHURCH GROUPS UNIONS, FRATERNAL OR ATHLETIC GROUPS, OR SCHOOL GROUPS?: NO

## 2024-06-17 SDOH — SOCIAL STABILITY: SOCIAL NETWORK: HOW OFTEN DO YOU ATTENT MEETINGS OF THE CLUB OR ORGANIZATION YOU BELONG TO?: NEVER

## 2024-06-17 SDOH — HEALTH STABILITY: MENTAL HEALTH: HOW OFTEN DO YOU HAVE 6 OR MORE DRINKS ON ONE OCCASION?: NEVER

## 2024-06-17 SDOH — SOCIAL STABILITY: SOCIAL NETWORK: HOW OFTEN DO YOU GET TOGETHER WITH FRIENDS OR RELATIVES?: NEVER

## 2024-06-17 ASSESSMENT — LIFESTYLE VARIABLES
SKIP TO QUESTIONS 9-10: 1
AUDIT-C TOTAL SCORE: 0

## 2024-06-17 ASSESSMENT — COGNITIVE AND FUNCTIONAL STATUS - GENERAL
DRESSING REGULAR LOWER BODY CLOTHING: A LITTLE
DRESSING REGULAR UPPER BODY CLOTHING: A LITTLE
TOILETING: A LITTLE
HELP NEEDED FOR BATHING: A LITTLE
DAILY ACTIVITIY SCORE: 20

## 2024-06-17 ASSESSMENT — PAIN SCALES - GENERAL: PAINLEVEL_OUTOF10: 5 - MODERATE PAIN

## 2024-06-17 ASSESSMENT — ACTIVITIES OF DAILY LIVING (ADL)
ADL_ASSISTANCE: INDEPENDENT
BATHING_ASSISTANCE: STAND BY
LACK_OF_TRANSPORTATION: NO

## 2024-06-17 ASSESSMENT — PAIN - FUNCTIONAL ASSESSMENT: PAIN_FUNCTIONAL_ASSESSMENT: 0-10

## 2024-06-17 NOTE — PROGRESS NOTES
06/17/24 6877   Discharge Planning   Living Arrangements Spouse/significant other   Support Systems Spouse/significant other;Children   Assistance Needed independent   Type of Residence Private residence   Number of Stairs to Enter Residence 3   Number of Stairs Within Residence 13   Do you have animals or pets at home? Yes   Type of Animals or Pets 1 dog and 1 cat   Home or Post Acute Services None   Patient expects to be discharged to: home with no needs   Does the patient need discharge transport arranged? No   Financial Resource Strain   How hard is it for you to pay for the very basics like food, housing, medical care, and heating? Not hard   Housing Stability   In the last 12 months, was there a time when you were not able to pay the mortgage or rent on time? N   In the last 12 months, how many places have you lived? 1   In the last 12 months, was there a time when you did not have a steady place to sleep or slept in a shelter (including now)? N   Transportation Needs   In the past 12 months, has lack of transportation kept you from medical appointments or from getting medications? no   In the past 12 months, has lack of transportation kept you from meetings, work, or from getting things needed for daily living? No     Estephania Elba De is a 49 y.o. female presenting with Nasuea and vomiting w/ associated Dizziness.   Patient states she has a history of migraines developed nausea and dizziness.  Patient states that she began feeling dizzy approximately 30 minutes prior to arrival.   Patient lives with spouse at home in a multi-level home , has no outside services. Has good family support. Patient is independent with ADLs and IADLs. Patient will be discharged to home with no needs.

## 2024-06-17 NOTE — PROGRESS NOTES
06/17/24 1403   [facility] Disability Status   Are you deaf or do you have serious difficulty hearing? N   Are you blind or do you have serious difficulty seeing, even when wearing glasses? N   Because of a physical, mental, or emotional condition, do you have serious difficulty concentrating, remembering, or making decisions? (5 years old or older) N   Do you have serious difficulty walking or climbing stairs? N   Do you have serious difficulty dressing or bathing? N   Because of a physical, mental, or emotional condition, do you have serious difficulty doing errands alone such as visiting the doctor? N

## 2024-06-17 NOTE — NURSING NOTE
Assumed care of patient. patient is resting comfortably in the bed. Call bell and possessions within reach.  at the bedside

## 2024-06-17 NOTE — CARE PLAN
The patient's goals for the shift include no dizziness    The clinical goals for the shift include safety, monitor vitals      Problem: Pain - Adult  Goal: Verbalizes/displays adequate comfort level or baseline comfort level  Outcome: Progressing     Problem: Discharge Planning  Goal: Discharge to home or other facility with appropriate resources  Outcome: Progressing     Problem: Chronic Conditions and Co-morbidities  Goal: Patient's chronic conditions and co-morbidity symptoms are monitored and maintained or improved  Outcome: Progressing     Problem: Pain  Goal: Takes deep breaths with improved pain control throughout the shift  Outcome: Progressing  Goal: Turns in bed with improved pain control throughout the shift  Outcome: Progressing  Goal: Walks with improved pain control throughout the shift  Outcome: Progressing  Goal: Performs ADL's with improved pain control throughout shift  Outcome: Progressing  Goal: Participates in PT with improved pain control throughout the shift  Outcome: Progressing  Goal: Free from opioid side effects throughout the shift  Outcome: Progressing  Goal: Free from acute confusion related to pain meds throughout the shift  Outcome: Progressing     Problem: Fall/Injury  Goal: Verbalize understanding of personal risk factors for fall in the hospital  Outcome: Progressing  Goal: Verbalize understanding of risk factor reduction measures to prevent injury from fall in the home  Outcome: Progressing  Goal: Use assistive devices by end of the shift  Outcome: Progressing  Goal: Pace activities to prevent fatigue by end of the shift  Outcome: Progressing

## 2024-06-17 NOTE — DISCHARGE SUMMARY
Discharge Diagnosis  Vertigo, Nausea and vomiting    Issues Requiring Follow-Up  Vertigo    Discharge Meds     Your medication list        START taking these medications        Instructions Last Dose Given Next Dose Due   meclizine 25 mg tablet  Commonly known as: Antivert      Take 1 tablet (25 mg) by mouth 3 times a day as needed for dizziness.       ondansetron ODT 4 mg disintegrating tablet  Commonly known as: Zofran-ODT      Take 1 tablet (4 mg) by mouth every 8 hours if needed for nausea or vomiting.       predniSONE 20 mg tablet  Commonly known as: Deltasone      Take 2 tablets (40 mg) by mouth once daily for 3 days.              CONTINUE taking these medications        Instructions Last Dose Given Next Dose Due   lisinopril 10 mg tablet           orphenadrine 100 mg 12 hr tablet  Commonly known as: Norflex      Take 1 tablet (100 mg) by mouth 2 times a day as needed for muscle spasms for up to 6 doses. Do not crush, chew, or split.                 Where to Get Your Medications        These medications were sent to [facility]  [address], [facility] [address]      Hours: 9 AM to 6 PM Mon-Fri, 9 AM to 1 PM Sat Phone: [phone]   meclizine 25 mg tablet  ondansetron ODT 4 mg disintegrating tablet  predniSONE 20 mg tablet         Test Results Pending At Discharge  Pending Labs       Order Current Status    Extra Urine Gray Tube Collected (06/16/24 2023)    Urinalysis with Reflex Culture and Microscopic In process            Hospital Course   Admitted for vertigo, nausea and vomiting. Pt was treated with Meclizine, steroids and IV fluids. MRI brain negative for stroke or acute process. Pt is feeling better. Was able to work with PT/OT. Medically stable for discharge. Will place referral for outpatient vestibular rehab. Will also place referral for ENT follow-up. To complete oral steroids on discharge.      Pertinent Physical Exam At Time of Discharge  Physical Exam  Constitutional:        Appearance: She is obese.   HENT:      Head: Normocephalic and atraumatic.      Nose: Nose normal.      Mouth/Throat:      Mouth: Mucous membranes are moist.      Pharynx: Oropharynx is clear.   Eyes:      Extraocular Movements: Extraocular movements intact.      Pupils: Pupils are equal, round, and reactive to light.   Cardiovascular:      Rate and Rhythm: Normal rate and regular rhythm.      Pulses: Normal pulses.   Pulmonary:      Effort: Pulmonary effort is normal.      Breath sounds: Normal breath sounds.   Abdominal:      General: Abdomen is flat. Bowel sounds are normal.      Palpations: Abdomen is soft.   Musculoskeletal:         General: Normal range of motion.   Skin:     General: Skin is warm and dry.      Capillary Refill: Capillary refill takes less than 2 seconds.   Neurological:      General: No focal deficit present.      Mental Status: She is oriented to person, place, and time.   Psychiatric:         Mood and Affect: Mood normal.         Outpatient Follow-Up  No future appointments.      Magda Campos, APRN-CNP

## 2024-06-17 NOTE — H&P
History Of Present Illness        Estephania De is a 49 y.o. female presenting with Nasuea and vomiting w/ associated Dizziness.      Patient states she has a history of migraines developed nausea and dizziness.  Patient states that she began feeling dizzy approximately 30 minutes prior to arrival.  Patient states that she was standing when she suddenly felt a room spinning sensation and became nauseous.  Patient started vomiting en route via EMS.  She denies history of TIA or CVA.  Denies all other pertinent history aside from hypertension.  She denies focal weakness or numbness.  No trauma to the head.  She states that opening her eyes and moving her head makes the symptoms worse.  Sitting still and closing her eyes makes the symptoms better.  Denies chest pain or shortness of breath.  No abdominal pain.  No diarrhea or constipation.  No dysuria or hematuria.       In the ED the patient was administered meclizine 25 mg p.o. x 1.  She was given Zofran 4 mg IV push x 1.  She was given Phenergan 12.5 mg IV x 1.  Patient was given Valium tablet 5 mg p.o. x 1.  She even underwent an MRI of the brain.  Despite all the above her symptoms still persisted and request for admission for PT/OT evaluation was made.  She was also given 1 L normal saline fluid challenge in the ED.        Her CBC with differential was essentially unremarkable except for an elevated absolute monocyte and lymphocyte count.  Patient's blood chemistry was essentially unremarkable except for an elevated glucose 128.  Her urinalysis was bland.  A urine pregnancy test was negative.  CT brain without contrast was negative for any acute cranial normality.  Cardiac enzyme levels were even checked for her and were negative x 2.  There were an MRI brain with and without contrast.  This was negative for any evidence of acute infarct, atrial mass effect or midline shift.  No abnormal intracranial enhancement.  Nonspecific scattered bilateral deep  white matter FLAIR hyperintensities which may represent mild chronic microvascular ischemic change, sequelae of migraines or other chronic etiologies.      Her vital signs in the ED were noted for Tmax 36.7, pulse rate 92, respiratory rate 18, BP 1 5477.  Saturating her percent on room air.       Past Medical History      Past Medical History:   Diagnosis Date    Tobacco abuse counseling 01/24/2013    Tobacco abuse counseling     Hypertension  Morbid obesity        Surgical History      Hysterectomy January 1, 2015       Social History    Former smoker      Family History      Hypertension       Allergies      Amoxicillin      Review of Systems    14-point ROS otherwise negative, as per HPI/Interval History.    General: No change in weight. No weakenss. No Fevers/Chills/Night Sweats. Yes to Dizziness.   Skin: No skin/hair/nail changes. No rashes or sores.  Head:  No trauma. No Headache/nasuea/vomitting.   Eyes: No visual changes. No tearing. No itching.   Ears: No hearing loss. No tinnitus. No vertigo. No discharge.  Nose, Sinuses: No rhinorrhea, No nasal congestion. No epistaxis.  Mouth, Throat, Neck: No bleeding gums, hoarseness, sore throat or swollen neck  Cardiac: No palpitations. No GERMAIN. No PND. No Orthopnea.   Respiratory: No Shortness of Breath. No wheezing. No cough. No hemoptysis.   GI: No nausea/vomiting. No indigestion. No diarrhea. No constipation.   Extremities: No numbness or tingling. No paresthesias.   Urinary: No change in urinary frequency. No change in hesitancy. No hematuria. No incontinence.  Neuro: Yes to dizziness         Physical Exam        Constitutional:  Pleasant  Eyes: PERRL, EOMI,   ENMT: mucous membranes moist  Head/Neck: Neck supple, No JVD,   Respiratory/Thorax: Patent airways, CTAB,   Cardiovascular: Regular, rate and rhythm, no murmurs  Gastrointestinal: Soft, non-distended, +BS.  Musculoskeletal: ROM intact, no joint swelling, normal strength  Extremities: peripheral pulses  "intact; no edema  Neurological: Alert and Oriented x 3; no focal deficits; gross motor and sensation intact; CN II-XII intact. No asterixis.  Psychological: Appropriate mood and behavior  Skin: No lesions, No rashes.         Last Recorded Vitals  Blood pressure 140/80, pulse 75, temperature 36.7 °C (98.1 °F), temperature source Oral, resp. rate 16, height 1.651 m (5' 5\"), weight 123 kg (271 lb 2.7 oz), SpO2 100%.    Relevant Results    Lab Results   Component Value Date    WBC 10.6 06/16/2024    HGB 14.0 06/16/2024    HCT 42.6 06/16/2024    MCV 89 06/16/2024     06/16/2024       Lab Results   Component Value Date    GLUCOSE 128 (H) 06/16/2024    CALCIUM 9.5 06/16/2024     06/16/2024    K 3.4 06/16/2024    CO2 24 06/16/2024     06/16/2024    BUN 13 06/16/2024    CREATININE 0.70 06/16/2024       No results found for: \"HGBA1C\"      CT head wo IV contrast    Result Date: 6/16/2024  Interpreted By:  Arturo Tony, STUDY: CT HEAD WO IV CONTRAST;  6/16/2024 6:40 pm   INDICATION: Signs/Symptoms:dizziness.   COMPARISON: None.   ACCESSION NUMBER(S): [id number]   ORDERING CLINICIAN: MARY PATRICK   TECHNIQUE: Noncontrast axial CT scan of head was performed. Angled reformats in brain and bone windows were generated. The images were reviewed in bone, brain, blood and soft tissue windows.   FINDINGS: CSF Spaces: The ventricles, sulci and basal cisterns are within normal limits. There is no extraaxial fluid collection.   Parenchyma:  The grey-white differentiation is intact. There is no mass effect or midline shift.  There is no intracranial hemorrhage.   Calvarium: The calvarium is unremarkable.   Paranasal sinuses and mastoids: Visualized paranasal sinuses and mastoids are clear.       No evidence of acute intracranial abnormality.   MACRO: None   Signed by: Arturo Tony 6/16/2024 6:48 PM Dictation workstation:   [id number]       Scheduled medications  amLODIPine, 5 mg, oral, Daily  enoxaparin, 40 mg, " subcutaneous, q12h TRIEC  predniSONE, 20 mg, oral, BID  scopolamine, 1 patch, transdermal, q72h      Continuous medications  sodium chloride 0.9%, 100 mL/hr      PRN medications  PRN medications: acetaminophen **OR** acetaminophen **OR** acetaminophen, benzocaine-menthol, dextromethorphan-guaifenesin, guaiFENesin, meclizine, ondansetron **OR** ondansetron, polyethylene glycol        Assessment/Plan   Principal Problem:    Nausea and vomiting  Active Problems:    Vertigo    Dizziness and giddiness    HTN (hypertension)        Estephania De is a 49 y.o. female presenting with Nasuea and vomiting w/ associated Dizziness.  Patient admitted for further evaluation and management.        Persistent nausea and vomiting with associated dizziness secondary to resume peripheral vertigo    Admit to RNF  Most probably peripheral vertigo considering MRI brain is negative  Probable Vestibular Neuritis and labyrinthitis   CTH w/o Contrast obtained.  Given meclizine/Zofran and 1 L normal saline bolus in the ED.  Administer low-dose prednisone in the setting of central vestibular neuritis  Scopolamine patch   PT/OT Consulted  Vestibular Rehab referral  Orthostatic vital signs ordered as a differential  Antiemetics  Clear liquid diet      Presumed migraine with variant    Diagnosis of exclusion if symptoms persist  Can consider neurology referral at discharge  She is not currently taking any Migraine therapy       Hyperglycemia    Hemoglobin A1c in the a.m.  Sitter insulin sliding scale if glucose levels remain elevated      Hypertension    Continue monitor BP and adjust anti-hypertensive medications accordingly  Patient previously was taking lisinopril 10 mg p.o. daily  Will continue       Obesity    Lifestyle and diet modification      GI + DVT prophylaxis    Short course H2 blocker while patient is on prednisone  Lovenox subcu            This Dictation was Transcribed using a Nuance Dragon Voice Recognition System Device  (with Compatible Computer + Software) and as such may contain Grammatical Errors and Unintentional Typing Misprints.      I spent 30 minutes in the professional and overall care of this patient.      Cali England MD

## 2024-06-17 NOTE — PROGRESS NOTES
Occupational Therapy    Evaluation    Patient Name: Estephania De  MRN: [id number]  Today's Date: 6/17/2024  Time Calculation  Start Time: 0756  Stop Time: 0810  Time Calculation (min): 14 min    Assessment  IP OT Assessment  OT Assessment: Patient is a 49 year old female admitted with n/v. Anticipate that patient will progress during acute hospitalization. Skilled OT to maximize patient's safety and independence with daily tasks.  Prognosis: Good  Barriers to Discharge: None  Evaluation/Treatment Tolerance:  (dizziness)  End of Session Communication: Bedside nurse  End of Session Patient Position: Bed, 3 rail up, Alarm on  Plan:  Treatment Interventions: ADL retraining, Functional transfer training, UE strengthening/ROM, Endurance training, Patient/family training, Compensatory technique education  OT Frequency: 2 times per week  OT Discharge Recommendations: Low intensity level of continued care  OT Recommended Transfer Status: Stand by assist  OT - OK to Discharge: Yes    Subjective   Current Problem:  1. Nausea and vomiting, unspecified vomiting type        2. Vertigo  Referral to Physical Therapy    meclizine (Antivert) 25 mg tablet    ondansetron ODT (Zofran-ODT) 4 mg disintegrating tablet    predniSONE (Deltasone) 20 mg tablet      3. Ataxia          General:  General  Reason for Referral: decline in ADLs; nausea and vomiting  Referred By: Dr. England  Past Medical History Relevant to Rehab: HTN, obesity  Family/Caregiver Present: Yes  Caregiver Feedback: spouse at bedside  Prior to Session Communication: Bedside nurse  Patient Position Received: Bed, 3 rail up, Alarm off, not on at start of session  Preferred Learning Style: verbal, visual  General Comment: Patient is a 49 year old female who presented to the ED with c/o n/v. Patient admitted with n/v, HTN, and vertigo. She is cleared by nursing for therapy. Patient in bed upon arrival and agreeable to participate  Precautions:  Medical Precautions:  Fall precautions  Pain:  Pain Assessment  Pain Assessment: 0-10  Pain Score: 5 - Moderate pain  Pain Type: Acute pain  Pain Location: Head    Objective   Cognition:  Overall Cognitive Status: Within Functional Limits  Orientation Level: Oriented X4     Home Living:  Type of Home: House  Lives With: Spouse  Home Adaptive Equipment: Cane  Home Layout: Multi-level, Stairs to alternate level with rails  Alternate Level Stairs-Rails: Right  Alternate Level Stairs-Number of Steps: 13  Home Access: Stairs to enter with rails  Entrance Stairs-Rails: Both  Entrance Stairs-Number of Steps: 3  Bathroom Shower/Tub: Tub/shower unit  Bathroom Toilet: Standard  Bathroom Equipment: None   Prior Function:  Level of [place]: Independent with ADLs and functional transfers, Independent with homemaking with ambulation  Receives Help From: Family  ADL Assistance: Independent  Homemaking Assistance: Independent  Ambulatory Assistance: Independent  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[employer])  Prior Function Comments: denies fall hx  IADL History:  Homemaking Responsibilities: Yes  IADL Comments: patient is independent with ADLs/IADLs at baseline  ADL:  Eating Assistance: Independent  Eating Deficit: Setup  Grooming Assistance: Stand by  Grooming Deficit: Steadying, Supervision/safety, Increased time to complete (per clinical judgement)  Bathing Assistance: Stand by  Bathing Deficit: Supervision/safety, Increased time to complete  (per clinical judgement)  UE Dressing Assistance: Stand by  UE Dressing Deficit: Setup  LE Dressing Assistance: Stand by  LE Dressing Deficit: Supervision/safety, Increased time to complete  Toileting Assistance with Device: Stand by  Toileting Deficit: Supervison/safety, Increased time to complete (per clinical judgement)  Activity Tolerance:  Endurance: Decreased tolerance for upright activites  Activity Tolerance Comments: limited due to dizziness  Bed Mobility/Transfers: Bed  Mobility  Bed Mobility: Yes  Bed Mobility 1  Bed Mobility 1: Supine to sitting  Level of Assistance 1: Close supervision  Bed Mobility Comments 1: head of bed elevated, use of bed rails  Bed Mobility 2  Bed Mobility  2: Sitting to supine  Level of Assistance 2: Close supervision  Bed Mobility Comments 2: head of bed elevated, use of bed rails    Transfers  Transfer: Yes  Transfer 1  Transfer From 1: Bed to  Transfer to 1: Stand  Technique 1: Sit to stand  Transfer Device 1:  (no AD)  Transfer Level of Assistance 1: Contact guard  Trials/Comments 1: x2 trials    Functional Mobility:  Functional Mobility  Functional Mobility Performed: Yes  Functional Mobility 1  Surface 1: Level tile  Device 1: No device  Assistance 1: Contact guard  Comments 1: < household distances  Sitting Balance:  Static Sitting Balance  Static Sitting-Balance Support: Feet supported  Static Sitting-Level of Assistance: Close supervision  Standing Balance:  Static Standing Balance  Static Standing-Balance Support: No upper extremity supported  Static Standing-Level of Assistance: Contact guard    IADL's:   Homemaking Responsibilities: Yes  IADL Comments: patient is independent with ADLs/IADLs at baseline    Sensation:  Sensation Comment: patient denies numbness/tingling  Strength:  Strength Comments: B UE 4-/5 grossly  Coordination:  Movements are Fluid and Coordinated: Yes   Hand Function:  Hand Function  Gross Grasp: Functional  Coordination: Functional  Extremities: RUE   RUE : Within Functional Limits and LUE   LUE: Within Functional Limits    Outcome Measures: [facility] Daily Activity  Putting on and taking off regular lower body clothing: A little  Bathing (including washing, rinsing, drying): A little  Putting on and taking off regular upper body clothing: A little  Toileting, which includes using toilet, bedpan or urinal: A little  Taking care of personal grooming such as brushing teeth: None  Eating Meals: None  Daily Activity - Total  Score: 20    Education Documentation  Body Mechanics, taught by Toyna Abarca OT at 6/17/2024  8:58 AM.  Learner: Patient  Readiness: Acceptance  Method: Explanation, Demonstration  Response: Needs Reinforcement, Verbalizes Understanding    Precautions, taught by Tonya Abarca OT at 6/17/2024  8:58 AM.  Learner: Patient  Readiness: Acceptance  Method: Explanation, Demonstration  Response: Needs Reinforcement, Verbalizes Understanding    ADL Training, taught by Tonya Abarca OT at 6/17/2024  8:58 AM.  Learner: Patient  Readiness: Acceptance  Method: Explanation, Demonstration  Response: Needs Reinforcement, Verbalizes Understanding    Education Comments  No comments found.      Goals:   Encounter Problems       Encounter Problems (Active)       OT Goals       ADLs (Progressing)       Start:  06/17/24    Expected End:  07/15/24       Patient will complete ADL tasks with [facility] to safely return to PLOF.         Functional Transfers (Progressing)       Start:  06/17/24    Expected End:  07/15/24       Patient will complete functional transfers with [facility] for increased safety with daily tasks.         B UE Strengthening (Progressing)       Start:  06/17/24    Expected End:  07/15/24       Patient will increase B UE strength to 5/5 for functional transfers.         Functional Mobility (Progressing)       Start:  06/17/24    Expected End:  07/15/24       Patient will demonstrate the ability to complete item retrieval and functional mobility with [facility] for increased safety with daily tasks.

## 2024-06-17 NOTE — PROGRESS NOTES
06/17/24 1403   Physical Activity   On average, how many days per week do you engage in moderate to strenuous exercise (like a brisk walk)? 0 days   On average, how many minutes do you engage in exercise at this level? 0 min   Financial Resource Strain   How hard is it for you to pay for the very basics like food, housing, medical care, and heating? Not hard   Housing Stability   In the last 12 months, was there a time when you were not able to pay the mortgage or rent on time? N   In the last 12 months, how many places have you lived? 1   In the last 12 months, was there a time when you did not have a steady place to sleep or slept in a shelter (including now)? N   Transportation Needs   In the past 12 months, has lack of transportation kept you from medical appointments or from getting medications? no   In the past 12 months, has lack of transportation kept you from meetings, work, or from getting things needed for daily living? No   Food Insecurity   Within the past 12 months, you worried that your food would run out before you got the money to buy more. Never true   Within the past 12 months, the food you bought just didn't last and you didn't have money to get more. Never true   Stress   Do you feel stress - tense, restless, nervous, or anxious, or unable to sleep at night because your mind is troubled all the time - these days? Not at all   Social Connections   How often do you get together with friends or relatives? Never   How often do you attend Mandaen or Buddhist services? Never   Do you belong to any clubs or organizations such as Mandaen groups, unions, fraternal or athletic groups, or school groups? No   How often do you attend meetings of the clubs or organizations you belong to? Never   Are you , , , , never , or living with a partner?    Intimate Partner Violence   Within the last year, have you been afraid of your partner or ex-partner? No   Within the  last year, have you been humiliated or emotionally abused in other ways by your partner or ex-partner? No   Within the last year, have you been kicked, hit, slapped, or otherwise physically hurt by your partner or ex-partner? No   Within the last year, have you been raped or forced to have any kind of sexual activity by your partner or ex-partner? No   Alcohol Use   Q1: How often do you have a drink containing alcohol? Never   Q2: How many drinks containing alcohol do you have on a typical day when you are drinking? None   Q3: How often do you have six or more drinks on one occasion? Never   Utilities   In the past 12 months has the electric, gas, oil, or water company threatened to shut off services in your home? No   Health Literacy   How often do you need to have someone help you when you read instructions, pamphlets, or other written material from your doctor or pharmacy? Never

## 2024-06-17 NOTE — CARE PLAN
Problem: Pain - Adult  Goal: Verbalizes/displays adequate comfort level or baseline comfort level  Outcome: Progressing     Problem: Safety - Adult  Goal: Free from fall injury  Outcome: Met     Problem: Pain  Goal: Takes deep breaths with improved pain control throughout the shift  Outcome: Progressing     Problem: Pain  Goal: Turns in bed with improved pain control throughout the shift  Outcome: Progressing     Problem: Fall/Injury  Goal: Not fall by end of shift  Outcome: Met     Problem: Fall/Injury  Goal: Be free from injury by end of the shift  Outcome: Met   The patient's goals for the shift include no dizziness    The clinical goals for the shift include no nausea vomiting    Over the shift, the patient did make progress toward the above goals.

## 2024-06-24 ENCOUNTER — APPOINTMENT (OUTPATIENT)
Dept: OTOLARYNGOLOGY | Facility: CLINIC | Age: 49
End: 2024-06-24
Payer: COMMERCIAL

## 2024-06-24 ENCOUNTER — APPOINTMENT (OUTPATIENT)
Dept: AUDIOLOGY | Facility: CLINIC | Age: 49
End: 2024-06-24
Payer: COMMERCIAL

## 2024-06-24 VITALS — HEIGHT: 64 IN | BODY MASS INDEX: 50.02 KG/M2 | TEMPERATURE: 97.8 F | WEIGHT: 293 LBS

## 2024-06-24 DIAGNOSIS — R42 DIZZINESS AND GIDDINESS: Primary | ICD-10-CM

## 2024-06-24 DIAGNOSIS — H93.11 TINNITUS OF RIGHT EAR: ICD-10-CM

## 2024-06-24 DIAGNOSIS — H93.293 ABNORMAL AUDITORY PERCEPTION OF BOTH EARS: ICD-10-CM

## 2024-06-24 DIAGNOSIS — R42 DIZZINESS: Primary | ICD-10-CM

## 2024-06-24 PROCEDURE — 99204 OFFICE O/P NEW MOD 45 MIN: CPT | Performed by: OTOLARYNGOLOGY

## 2024-06-24 PROCEDURE — 92557 COMPREHENSIVE HEARING TEST: CPT | Performed by: AUDIOLOGIST

## 2024-06-24 PROCEDURE — 92550 TYMPANOMETRY & REFLEX THRESH: CPT | Performed by: AUDIOLOGIST

## 2024-06-24 NOTE — PROGRESS NOTES
AUDIOLOGY ADULT AUDIOMETRIC EVALUATION    Name:  Estephania De  :  1975  Age:  49 y.o.  Date of Evaluation:  2024    Reason for visit: Ms. De is seen in the clinic today at the request of otolaryngology for an audiologic evaluation.     HISTORY  Patient complains of FEELING DIZZY  today after a trip to the hospital for vertigo.  She reports popping in the right ear and feels pressure in the right ear as well. She works in noise.    EVALUATION  See scanned audiogram: “Media” > “Audiology Report”.      RESULTS  Otoscopic Evaluation:  Right Ear: clear ear canal  Left Ear: clear ear canal    Immittance Measures:  Tympanometry:  Right Ear:  Type A, normal tympanic membrane mobility with normal middle ear pressure   Left Ear: Type A, normal tympanic membrane mobility with normal middle ear pressure     Acoustic Reflexes:  Ipsilateral Right Ear:  95 100 95 95   Ipsilateral Left Ear:    100 100 100 100   Contralateral Right Ear: did not evaluate  Contralateral Left Ear: did not evaluate    Distortion Product Otoacoustic Emissions (DPOAEs):  Right Ear: PASS: 1-8 K HZ.  Left Ear:   PASS: 1-6 K HZ AND REFER: 8 K HZ.    Audiometry:  Test Technique and Reliability: BEHAVIORAL   Standard audiometry via supra-aural headphones. Reliability is good.    Pure tone air and bone conduction audiometry:  Right Ear:  WITHIN NORMAL LIMITS 125- 8 K HZ  Left Ear:     WITHIN NORMAL LIMITS 125- 8 K HZ    Speech Audiometry (Word Recognition Scores):   Right Ear:  100% EXCELLENT  Left Ear:     100% EXCELLENT    IMPRESSIONS  WNL AU WITH DIZZINESS, PRESSURE, FULLNESS AND RIGHT POPPING.  The presence of acoustic reflexes within normal intensity limits is consistent with normal middle ear and brainstem function, and suggests that auditory sensitivity is not significantly impaired. An elevated or absent acoustic reflex threshold is consistent with a middle ear disorder, hearing loss in the stimulated ear, and/or  interruption of neural innervation of the stapedius muscle. Present DPOAEs suggest normal/near normal cochlear outer hair cell function and are consistent with no greater than a mild hearing loss at those frequencies. Absent DPOAEs are consistent with abnormal cochlear outer hair cell function and some degree of hearing loss at those frequencies.    RECOMMENDATIONS  - Follow up with otolaryngology today as scheduled. Special tests as needed for dizzy, fullness, pressure and popping.  - Audiologic evaluation as needed.  - Annual audiologic evaluation, sooner if an acute change is noted.  - Audiologic evaluation in conjunction with otologic care, if an acute change is noted, and/or annually.  - Follow-up with audiology annually for routine hearing aid maintenance, sooner if questions/problems arise.  - Follow-up with medical care team as planned.    PATIENT EDUCATION  Discussed results, impressions and recommendations with the patient. Questions were addressed and the patient was encouraged to contact our office should concerns arise.    Time for this encounter: 35 minutes    Bill Martinez  Licensed Audiologist

## 2024-06-24 NOTE — PROGRESS NOTES
"Chief Complaint   Patient presents with    New Patient Visit     NP VERTIGO, SEEN IN ER [facility] ON 6/16/24, HAD SCANS AND MRI,       Date of Evaluation: 6/24/2024   [facility]  Estephania De is a 49 y.o. female who developed sudden onset vertigo 1 week ago.  She was seen in the emergency room and had a negative workup including normal MRI of the head.  She was discharged on a 3-day course of prednisone 40 mg.  The true spinning vertigo has resolved but she still has an ongoing sense of imbalance.  Her audiogram is normal.  She had some popping noise in the right ear.  She has stopped the meclizine.  She denies any changes in her hearing.       Past Medical History:   Diagnosis Date    Tobacco abuse counseling 01/24/2013    Tobacco abuse counseling      History reviewed. No pertinent surgical history.       Medications:   Current Outpatient Medications   Medication Instructions    lisinopril 10 mg, oral, Daily    meclizine (ANTIVERT) 25 mg, oral, 3 times daily PRN    ondansetron ODT (ZOFRAN-ODT) 4 mg, oral, Every 8 hours PRN    orphenadrine (NORFLEX) 100 mg, oral, 2 times daily PRN, Do not crush, chew, or split.        Allergies:  Allergies   Allergen Reactions    Amoxicillin Other        Physical Exam:  Last Recorded Vitals  Temperature 36.6 °C (97.8 °F), height 1.626 m (5' 4\"), weight 137 kg (303 lb).  []General appearance: Well-developed, well-nourished in no acute distress, conversant with normal voice quality    Head/face: No erythema or edema or facial tenderness, and normal facial nerve function bilaterally    External ear: Clear external auditory canals with normal pinnae a hair was removed from the surface of the right tympanic membrane  Tube status: N/A  Middle ear: Tympanic membranes intact and mobile, middle ears normal.  Tympanic membrane perforation: N/A  Mastoid bowl: N/A  Hearing: Normal conversational awareness at normal speech thresholds    Nose visualized using: Anterior rhinoscopy  Nasal " dorsum: Nontraumatic midline appearance  Septum: Midline, nonobstructing  Inferior turbinates: Normal, pink  Secretions: Dry    Oral cavity and oropharynx: Normal  Teeth: Good condition  Floor of mouth: without lesions  Palate: Normal hard palate, soft palate and uvula  Oropharynx: Clear, no lesions present  Buccal mucosa: Normal without masses or lesions  Lips: Normal    Nasopharynx: Inadequate mirror exam secondary to gag/anatomy    Neck:  Salivary glands: Normal bilateral parotid and submandibular glands by inspection and palpation.  Non-thyroid masses: No palpable masses or significant lymphadenopathy  Trachea: Midline  Thyroid: No thyromegaly or palpable nodules  Temporomandibular joint: Nontender  Cervical range of motion: Normal    Neurologic exam: Alert and oriented x3, appropriate affect.  Cranial nerves II-XII normal bilaterally  Extraocular movement: Extraocular movement intact, normal gaze alignment        Estephania was seen today for new patient visit.  Diagnoses and all orders for this visit:  Dizziness and giddiness (Primary)  -     V-Hit; Future  Tinnitus of right ear       PLAN  I believe she likely had viral labyrinthitis with incomplete recovery thus far.  I would like to check a VNG.  I discussed the usual recovery timeframe over 6 weeks with 85% improvement and 95% improvement at 6 months.  She may benefit from vestibular rehab depending upon the findings on VNG.  Follow-up after testing    Navdeep Cartwright MD

## 2024-07-16 ENCOUNTER — APPOINTMENT (OUTPATIENT)
Dept: AUDIOLOGY | Facility: CLINIC | Age: 49
End: 2024-07-16
Payer: COMMERCIAL

## 2024-11-22 ENCOUNTER — TELEPHONE (OUTPATIENT)
Dept: PRIMARY CARE | Facility: CLINIC | Age: 49
End: 2024-11-22
Payer: COMMERCIAL

## 2024-11-22 DIAGNOSIS — Z11.59 NEED FOR HEPATITIS C SCREENING TEST: ICD-10-CM

## 2024-11-22 DIAGNOSIS — Z13.220 LIPID SCREENING: ICD-10-CM

## 2024-11-22 DIAGNOSIS — I10 HYPERTENSION, UNSPECIFIED TYPE: ICD-10-CM

## 2024-11-22 DIAGNOSIS — Z00.00 WELL ADULT EXAM: ICD-10-CM

## 2024-11-22 PROBLEM — L72.3 SEBACEOUS CYST: Status: RESOLVED | Noted: 2024-11-22 | Resolved: 2024-11-22

## 2024-11-22 PROBLEM — J02.9 SORE THROAT: Status: RESOLVED | Noted: 2021-10-08 | Resolved: 2024-11-22

## 2024-11-22 PROBLEM — R10.9 ABDOMINAL PAIN: Status: RESOLVED | Noted: 2024-11-22 | Resolved: 2024-11-22

## 2024-11-22 PROBLEM — E66.01 MORBID OBESITY (MULTI): Status: ACTIVE | Noted: 2021-07-07

## 2024-11-22 PROBLEM — M51.26 HERNIATED LUMBAR INTERVERTEBRAL DISC: Status: RESOLVED | Noted: 2024-11-22 | Resolved: 2024-11-22

## 2024-11-22 PROBLEM — L02.01 ABSCESS OF FACE: Status: RESOLVED | Noted: 2022-06-02 | Resolved: 2024-11-22

## 2024-11-22 PROBLEM — J35.9 CHRONIC DISEASE OF TONSILS AND ADENOIDS: Status: ACTIVE | Noted: 2021-10-08

## 2024-11-22 PROBLEM — J35.8 TONSILLAR CALCULUS: Status: RESOLVED | Noted: 2021-10-08 | Resolved: 2024-11-22

## 2024-11-22 PROBLEM — J02.9 ACUTE PHARYNGITIS: Status: RESOLVED | Noted: 2021-10-08 | Resolved: 2024-11-22

## 2024-12-06 ENCOUNTER — LAB (OUTPATIENT)
Dept: LAB | Facility: LAB | Age: 49
End: 2024-12-06
Payer: COMMERCIAL

## 2024-12-06 DIAGNOSIS — Z00.00 WELL ADULT EXAM: ICD-10-CM

## 2024-12-06 DIAGNOSIS — I10 HYPERTENSION, UNSPECIFIED TYPE: ICD-10-CM

## 2024-12-06 DIAGNOSIS — Z13.220 LIPID SCREENING: ICD-10-CM

## 2024-12-06 DIAGNOSIS — Z11.59 NEED FOR HEPATITIS C SCREENING TEST: ICD-10-CM

## 2024-12-06 LAB
ALBUMIN SERPL BCP-MCNC: 4.3 G/DL (ref 3.4–5)
ALP SERPL-CCNC: 51 U/L (ref 33–110)
ALT SERPL W P-5'-P-CCNC: 24 U/L (ref 7–45)
ANION GAP SERPL CALCULATED.3IONS-SCNC: 10 MMOL/L (ref 10–20)
AST SERPL W P-5'-P-CCNC: 19 U/L (ref 9–39)
BASOPHILS # BLD AUTO: 0.02 X10*3/UL (ref 0–0.1)
BASOPHILS NFR BLD AUTO: 0.4 %
BILIRUB SERPL-MCNC: 0.5 MG/DL (ref 0–1.2)
BUN SERPL-MCNC: 10 MG/DL (ref 6–23)
CALCIUM SERPL-MCNC: 9.1 MG/DL (ref 8.6–10.3)
CHLORIDE SERPL-SCNC: 105 MMOL/L (ref 98–107)
CHOLEST SERPL-MCNC: 206 MG/DL (ref 0–199)
CHOLEST/HDLC SERPL: 4.3 {RATIO}
CO2 SERPL-SCNC: 28 MMOL/L (ref 21–32)
CREAT SERPL-MCNC: 0.69 MG/DL (ref 0.5–1.05)
CREAT UR-MCNC: 175 MG/DL (ref 20–320)
EGFRCR SERPLBLD CKD-EPI 2021: >90 ML/MIN/1.73M*2
EOSINOPHIL # BLD AUTO: 0.07 X10*3/UL (ref 0–0.7)
EOSINOPHIL NFR BLD AUTO: 1.4 %
ERYTHROCYTE [DISTWIDTH] IN BLOOD BY AUTOMATED COUNT: 12.6 % (ref 11.5–14.5)
GLUCOSE SERPL-MCNC: 96 MG/DL (ref 74–99)
HCT VFR BLD AUTO: 43.7 % (ref 36–46)
HCV AB SER QL: NONREACTIVE
HDLC SERPL-MCNC: 47.9 MG/DL
HGB BLD-MCNC: 14.2 G/DL (ref 12–16)
IMM GRANULOCYTES # BLD AUTO: 0.01 X10*3/UL (ref 0–0.7)
IMM GRANULOCYTES NFR BLD AUTO: 0.2 % (ref 0–0.9)
LDLC SERPL CALC-MCNC: 132 MG/DL
LYMPHOCYTES # BLD AUTO: 1.94 X10*3/UL (ref 1.2–4.8)
LYMPHOCYTES NFR BLD AUTO: 38.8 %
MCH RBC QN AUTO: 29.5 PG (ref 26–34)
MCHC RBC AUTO-ENTMCNC: 32.5 G/DL (ref 32–36)
MCV RBC AUTO: 91 FL (ref 80–100)
MICROALBUMIN UR-MCNC: 21.6 MG/L
MICROALBUMIN/CREAT UR: 12.3 UG/MG CREAT
MONOCYTES # BLD AUTO: 0.47 X10*3/UL (ref 0.1–1)
MONOCYTES NFR BLD AUTO: 9.4 %
NEUTROPHILS # BLD AUTO: 2.49 X10*3/UL (ref 1.2–7.7)
NEUTROPHILS NFR BLD AUTO: 49.8 %
NON HDL CHOLESTEROL: 158 MG/DL (ref 0–149)
NRBC BLD-RTO: 0 /100 WBCS (ref 0–0)
PLATELET # BLD AUTO: 245 X10*3/UL (ref 150–450)
POTASSIUM SERPL-SCNC: 4.2 MMOL/L (ref 3.5–5.3)
PROT SERPL-MCNC: 6.6 G/DL (ref 6.4–8.2)
RBC # BLD AUTO: 4.81 X10*6/UL (ref 4–5.2)
SODIUM SERPL-SCNC: 139 MMOL/L (ref 136–145)
TRIGL SERPL-MCNC: 132 MG/DL (ref 0–149)
VLDL: 26 MG/DL (ref 0–40)
WBC # BLD AUTO: 5 X10*3/UL (ref 4.4–11.3)

## 2024-12-06 PROCEDURE — 86803 HEPATITIS C AB TEST: CPT

## 2024-12-06 PROCEDURE — 82043 UR ALBUMIN QUANTITATIVE: CPT

## 2024-12-06 PROCEDURE — 85025 COMPLETE CBC W/AUTO DIFF WBC: CPT

## 2024-12-06 PROCEDURE — 36415 COLL VENOUS BLD VENIPUNCTURE: CPT

## 2024-12-06 PROCEDURE — 80053 COMPREHEN METABOLIC PANEL: CPT

## 2024-12-06 PROCEDURE — 80061 LIPID PANEL: CPT

## 2024-12-06 PROCEDURE — 82570 ASSAY OF URINE CREATININE: CPT

## 2024-12-19 ENCOUNTER — APPOINTMENT (OUTPATIENT)
Dept: PRIMARY CARE | Facility: CLINIC | Age: 49
End: 2024-12-19
Payer: COMMERCIAL

## 2024-12-19 VITALS
DIASTOLIC BLOOD PRESSURE: 90 MMHG | SYSTOLIC BLOOD PRESSURE: 150 MMHG | HEART RATE: 87 BPM | BODY MASS INDEX: 48.82 KG/M2 | WEIGHT: 293 LBS | HEIGHT: 65 IN | OXYGEN SATURATION: 91 %

## 2024-12-19 DIAGNOSIS — E78.00 PURE HYPERCHOLESTEROLEMIA: ICD-10-CM

## 2024-12-19 DIAGNOSIS — Z12.11 SCREENING FOR COLON CANCER: ICD-10-CM

## 2024-12-19 DIAGNOSIS — Z12.31 SCREENING MAMMOGRAM FOR BREAST CANCER: ICD-10-CM

## 2024-12-19 DIAGNOSIS — M25.561 ACUTE PAIN OF RIGHT KNEE: ICD-10-CM

## 2024-12-19 DIAGNOSIS — Z00.00 WELL ADULT EXAM: Primary | ICD-10-CM

## 2024-12-19 DIAGNOSIS — I10 PRIMARY HYPERTENSION: ICD-10-CM

## 2024-12-19 PROCEDURE — 3008F BODY MASS INDEX DOCD: CPT

## 2024-12-19 PROCEDURE — 99396 PREV VISIT EST AGE 40-64: CPT

## 2024-12-19 PROCEDURE — 99214 OFFICE O/P EST MOD 30 MIN: CPT

## 2024-12-19 PROCEDURE — 3077F SYST BP >= 140 MM HG: CPT

## 2024-12-19 PROCEDURE — 3080F DIAST BP >= 90 MM HG: CPT

## 2024-12-19 RX ORDER — LISINOPRIL 10 MG/1
10 TABLET ORAL DAILY
Qty: 90 TABLET | Refills: 0 | Status: SHIPPED | OUTPATIENT
Start: 2024-12-19 | End: 2025-03-19

## 2024-12-19 ASSESSMENT — ENCOUNTER SYMPTOMS
SHORTNESS OF BREATH: 0
FEVER: 0
NAUSEA: 0
TROUBLE SWALLOWING: 0
NERVOUS/ANXIOUS: 0
UNEXPECTED WEIGHT CHANGE: 0
CHILLS: 0
VOMITING: 0
SORE THROAT: 0
RHINORRHEA: 0
DYSURIA: 0
ARTHRALGIAS: 0
NUMBNESS: 0
HEMATURIA: 0
DYSPHORIC MOOD: 0
MYALGIAS: 0
BLOOD IN STOOL: 0
COUGH: 0
WEAKNESS: 0
ABDOMINAL PAIN: 0
HEADACHES: 0

## 2024-12-19 ASSESSMENT — PAIN SCALES - GENERAL: PAINLEVEL_OUTOF10: 0-NO PAIN

## 2024-12-19 ASSESSMENT — PATIENT HEALTH QUESTIONNAIRE - PHQ9
2. FEELING DOWN, DEPRESSED OR HOPELESS: NOT AT ALL
1. LITTLE INTEREST OR PLEASURE IN DOING THINGS: NOT AT ALL
SUM OF ALL RESPONSES TO PHQ9 QUESTIONS 1 AND 2: 0

## 2024-12-19 NOTE — PROGRESS NOTES
Subjective   Patient ID: Estephania De is a 49 y.o. female who presents for Annual Exam (Declines flu shot ).    HPI   Estephania De is seen for her comprehensive physical exam. PMH, SH, FH, medications were reviewed and updated.     CHRONIC ISSUES:   HTN: On Lisinopril 10mg, has been out of medication for about 6 weeks. Does not check home BP. Denies chest pain, heart palpitations, SOB, dizziness, headaches, changes of vision, syncope, leg swelling.     HLD: On no medications. Recent . ASCVD 2.9%.     Right knee pain x3 months. No known injury. Has tried NSAIDs/Tylenol. Pain with pivoting. No numbness, tingling, weakness of RLE.     IMMUNIZATIONS:   Immunization History   Administered Date(s) Administered    Pfizer Purple Cap SARS-CoV-2 2021, 2021   Influenza: Declines  Tdap: Declines   Shingles: Due, will think about it      LUNG CANCER SCREENING (50-76 y.o. with 20+ pack year hx & current smoker or quit <15yrs ago)  Social History     Tobacco Use   Smoking Status Former    Current packs/day: 0.00    Average packs/day: 0.5 packs/day for 27.0 years (13.5 ttl pk-yrs)    Types: Cigarettes    Start date:     Quit date: 2019    Years since quittin.9   Smokeless Tobacco Never     COLORECTAL SCREENING (From 44 or 10yrs younger than family diagnosis)  Last colonoscopy - Never   Next colonoscopy due - Due   Relevant Hx - None    GYN  LMP - Hysterectomy 18 years ago, removed cervix and uterus, denies vaginal bleeding  Last Pap - , NILM/-HPV  Next Pap due - No longer indicated     MAMMOGRAM SCREENING (From age 39)   Last mammogram - Never   Next mammogram due - Due    Sees eye doctor annually     Review of Systems   Constitutional:  Negative for chills, fever and unexpected weight change.   HENT:  Negative for congestion, ear pain, hearing loss, rhinorrhea, sore throat and trouble swallowing.    Eyes:  Negative for visual disturbance.   Respiratory:  Negative for cough and  "shortness of breath.    Cardiovascular:  Negative for chest pain and leg swelling.   Gastrointestinal:  Negative for abdominal pain, blood in stool, nausea and vomiting.   Genitourinary:  Negative for dysuria and hematuria.   Musculoskeletal:  Negative for arthralgias and myalgias.   Skin:  Negative for rash.   Neurological:  Negative for weakness, numbness and headaches.   Psychiatric/Behavioral:  Negative for dysphoric mood. The patient is not nervous/anxious.        Objective   /90   Pulse 87   Ht 1.64 m (5' 4.57\")   Wt 142 kg (314 lb)   SpO2 91%   BMI 52.96 kg/m²     Physical Exam  Vitals and nursing note reviewed.   Constitutional:       General: She is not in acute distress.     Appearance: She is obese.   HENT:      Right Ear: Tympanic membrane and ear canal normal.      Left Ear: Tympanic membrane and ear canal normal.      Nose: Nose normal.      Mouth/Throat:      Mouth: Mucous membranes are moist.   Eyes:      Extraocular Movements: Extraocular movements intact.      Conjunctiva/sclera: Conjunctivae normal.      Pupils: Pupils are equal, round, and reactive to light.   Neck:      Vascular: No carotid bruit.   Cardiovascular:      Rate and Rhythm: Normal rate and regular rhythm.   Pulmonary:      Effort: Pulmonary effort is normal.      Breath sounds: Normal breath sounds.   Chest:   Breasts:     Right: No mass or nipple discharge.      Left: No mass or nipple discharge.   Abdominal:      General: Abdomen is flat.      Palpations: Abdomen is soft.      Tenderness: There is no abdominal tenderness.   Genitourinary:     Comments: Declines chaperone  Musculoskeletal:         General: Normal range of motion.      Cervical back: Neck supple.      Right upper leg: Normal.      Left upper leg: Normal.      Right knee: No swelling, effusion or bony tenderness. Normal range of motion. No tenderness.      Instability Tests: Anterior drawer test negative. Posterior drawer test negative. Medial Alia " test negative and lateral Alia test negative.   Lymphadenopathy:      Cervical: No cervical adenopathy.      Upper Body:      Right upper body: No supraclavicular or axillary adenopathy.      Left upper body: No supraclavicular or axillary adenopathy.   Skin:     General: Skin is warm.   Neurological:      General: No focal deficit present.      Mental Status: She is alert.   Psychiatric:         Mood and Affect: Mood normal.         Assessment/Plan   Assessment & Plan  Well adult exam  Preventative measures discussed. Labs reviewed with patient. Immunizations discussed.          Primary hypertension  Chronic. Restart Lisinopril 10mg daily. Risks and benefits of medication discussed and prescribed. DASH diet and 30 minutes of exercise 5x/week. Check home BP and keep log. Recheck in 1 month.     Orders:    lisinopril 10 mg tablet; Take 1 tablet (10 mg) by mouth once daily.    [facility] flowsheet; Future    Pure hypercholesterolemia  Chronic. Keep off medication at this time. CT calcium scoring test ordered. Decrease fast food, fried food, processed foods and large amounts of red meat. Increase lean protein, vegetables and exercise. Recheck in 6 months.     Orders:    CT cardiac scoring wo IV contrast; Future    Acute pain of right knee  Acute.   Xray ordered, will follow up with results.   OTC Tylenol/NSAIDs as directed for pain. Rest, ice, compression, elevation.   Follow up if symptoms worsen.     Orders:    XR knee right 4+ views; Future    Screening mammogram for breast cancer    Orders:    BI mammo bilateral screening tomosynthesis; Future    Screening for colon cancer    Orders:    Colonoscopy Screening; Average Risk Patient; Future

## 2024-12-19 NOTE — ASSESSMENT & PLAN NOTE
Chronic. Restart Lisinopril 10mg daily. Risks and benefits of medication discussed and prescribed. DASH diet and 30 minutes of exercise 5x/week. Check home BP and keep log. Recheck in 1 month.     Orders:    lisinopril 10 mg tablet; Take 1 tablet (10 mg) by mouth once daily.    [facility] BP flowsheet; Future

## 2025-01-10 ENCOUNTER — HOSPITAL ENCOUNTER (OUTPATIENT)
Dept: RADIOLOGY | Facility: HOSPITAL | Age: 50
Discharge: HOME | End: 2025-01-10
Payer: COMMERCIAL

## 2025-01-10 VITALS — WEIGHT: 293 LBS | BODY MASS INDEX: 48.82 KG/M2 | HEIGHT: 65 IN

## 2025-01-10 DIAGNOSIS — Z12.31 SCREENING MAMMOGRAM FOR BREAST CANCER: ICD-10-CM

## 2025-01-10 PROCEDURE — 77067 SCR MAMMO BI INCL CAD: CPT

## 2025-01-17 ENCOUNTER — HOSPITAL ENCOUNTER (OUTPATIENT)
Dept: RADIOLOGY | Facility: HOSPITAL | Age: 50
Discharge: HOME | End: 2025-01-17
Payer: COMMERCIAL

## 2025-01-17 DIAGNOSIS — R92.8 OTHER ABNORMAL AND INCONCLUSIVE FINDINGS ON DIAGNOSTIC IMAGING OF BREAST: ICD-10-CM

## 2025-01-17 PROCEDURE — 76982 USE 1ST TARGET LESION: CPT | Mod: RT

## 2025-01-17 PROCEDURE — 76642 ULTRASOUND BREAST LIMITED: CPT | Mod: RT

## 2025-01-23 ENCOUNTER — APPOINTMENT (OUTPATIENT)
Dept: PRIMARY CARE | Facility: CLINIC | Age: 50
End: 2025-01-23
Payer: COMMERCIAL

## 2025-01-23 VITALS
OXYGEN SATURATION: 97 % | BODY MASS INDEX: 50.02 KG/M2 | HEIGHT: 64 IN | SYSTOLIC BLOOD PRESSURE: 140 MMHG | TEMPERATURE: 98 F | WEIGHT: 293 LBS | DIASTOLIC BLOOD PRESSURE: 92 MMHG | HEART RATE: 93 BPM

## 2025-01-23 DIAGNOSIS — E66.01 CLASS 3 SEVERE OBESITY WITH BODY MASS INDEX (BMI) OF 50.0 TO 59.9 IN ADULT, UNSPECIFIED OBESITY TYPE, UNSPECIFIED WHETHER SERIOUS COMORBIDITY PRESENT: ICD-10-CM

## 2025-01-23 DIAGNOSIS — I10 PRIMARY HYPERTENSION: Primary | ICD-10-CM

## 2025-01-23 DIAGNOSIS — E66.813 CLASS 3 SEVERE OBESITY WITH BODY MASS INDEX (BMI) OF 50.0 TO 59.9 IN ADULT, UNSPECIFIED OBESITY TYPE, UNSPECIFIED WHETHER SERIOUS COMORBIDITY PRESENT: ICD-10-CM

## 2025-01-23 PROCEDURE — 3008F BODY MASS INDEX DOCD: CPT

## 2025-01-23 PROCEDURE — 3080F DIAST BP >= 90 MM HG: CPT

## 2025-01-23 PROCEDURE — 99214 OFFICE O/P EST MOD 30 MIN: CPT

## 2025-01-23 PROCEDURE — 3077F SYST BP >= 140 MM HG: CPT

## 2025-01-23 RX ORDER — CETIRIZINE HYDROCHLORIDE 10 MG/1
10 TABLET, CHEWABLE ORAL DAILY
COMMUNITY

## 2025-01-23 RX ORDER — LISINOPRIL 20 MG/1
20 TABLET ORAL DAILY
Start: 2025-01-23 | End: 2025-03-24

## 2025-01-23 ASSESSMENT — PATIENT HEALTH QUESTIONNAIRE - PHQ9
2. FEELING DOWN, DEPRESSED OR HOPELESS: NOT AT ALL
SUM OF ALL RESPONSES TO PHQ9 QUESTIONS 1 AND 2: 0
1. LITTLE INTEREST OR PLEASURE IN DOING THINGS: NOT AT ALL

## 2025-01-23 ASSESSMENT — PAIN SCALES - GENERAL: PAINLEVEL_OUTOF10: 0-NO PAIN

## 2025-01-23 NOTE — ASSESSMENT & PLAN NOTE
Chronic, needs better control. Increase Lisinopril to 20mg daily. DASH diet and 30 minutes of exercise 5x/week. Check home BP and keep log. Recheck in 1 month.     Orders:    lisinopril 20 mg tablet; Take 1 tablet (20 mg) by mouth once daily.

## 2025-01-23 NOTE — ASSESSMENT & PLAN NOTE
Chronic. Start Zepbound 2.5mg/week. Decrease fast food, fried food, processed foods, carbohydrates. Increase lean protein, vegetables and fiber. Exercise 30 minutes 4-5x/week. Recheck in 1 month.    Orders:    tirzepatide, weight loss, (Zepbound) 2.5 mg/0.5 mL injection; Inject 2.5 mg under the skin every 7 days.

## 2025-01-23 NOTE — PROGRESS NOTES
"Subjective   Patient ID: Estephania De is a 49 y.o. female who presents for Hypertension and Weight Loss (options).    HPI   HTN: On Lisinopril 10mg. No medication side effects. Home BP x3 over past month, 140's/90's. Denies chest pain, heart palpitations, SOB, dizziness,  changes of vision, syncope, leg swelling.     Obesity: Interested in weight loss medication.     Review of Systems  All other systems have been reviewed and are negative except as noted in the HPI.     Objective   BP (!) 140/92 (BP Location: Left arm, Patient Position: Sitting)   Pulse 93   Temp 36.7 °C (98 °F) (Temporal)   Ht 1.626 m (5' 4\")   Wt 142 kg (313 lb)   SpO2 97%   BMI 53.73 kg/m²     Physical Exam  Vitals and nursing note reviewed.   Constitutional:       General: She is not in acute distress.     Appearance: Normal appearance. She is obese.   Eyes:      Extraocular Movements: Extraocular movements intact.      Conjunctiva/sclera: Conjunctivae normal.   Neck:      Vascular: No carotid bruit.   Cardiovascular:      Rate and Rhythm: Normal rate and regular rhythm.   Pulmonary:      Effort: Pulmonary effort is normal.      Breath sounds: Normal breath sounds.   Musculoskeletal:      Cervical back: Neck supple.      Right lower leg: No edema.      Left lower leg: No edema.   Neurological:      General: No focal deficit present.      Mental Status: She is alert.   Psychiatric:         Mood and Affect: Mood normal.         Assessment/Plan   Assessment & Plan  Primary hypertension  Chronic, needs better control. Increase Lisinopril to 20mg daily. DASH diet and 30 minutes of exercise 5x/week. Check home BP and keep log. Recheck in 1 month.     Orders:    lisinopril 20 mg tablet; Take 1 tablet (20 mg) by mouth once daily.    Class 3 severe obesity with body mass index (BMI) of 50.0 to 59.9 in adult, unspecified obesity type, unspecified whether serious comorbidity present  Chronic. Start Zepbound 2.5mg/week. Decrease fast food, " fried food, processed foods, carbohydrates. Increase lean protein, vegetables and fiber. Exercise 30 minutes 4-5x/week. Recheck in 1 month.    Orders:    tirzepatide, weight loss, (Zepbound) 2.5 mg/0.5 mL injection; Inject 2.5 mg under the skin every 7 days.

## 2025-01-27 DIAGNOSIS — E66.01 CLASS 3 SEVERE OBESITY WITH BODY MASS INDEX (BMI) OF 50.0 TO 59.9 IN ADULT, UNSPECIFIED OBESITY TYPE, UNSPECIFIED WHETHER SERIOUS COMORBIDITY PRESENT: ICD-10-CM

## 2025-01-27 DIAGNOSIS — E66.813 CLASS 3 SEVERE OBESITY WITH BODY MASS INDEX (BMI) OF 50.0 TO 59.9 IN ADULT, UNSPECIFIED OBESITY TYPE, UNSPECIFIED WHETHER SERIOUS COMORBIDITY PRESENT: ICD-10-CM

## 2025-02-04 ENCOUNTER — TELEPHONE (OUTPATIENT)
Dept: PRIMARY CARE | Facility: CLINIC | Age: 50
End: 2025-02-04
Payer: COMMERCIAL

## 2025-02-04 DIAGNOSIS — I10 PRIMARY HYPERTENSION: ICD-10-CM

## 2025-02-04 RX ORDER — LISINOPRIL 20 MG/1
20 TABLET ORAL DAILY
Qty: 90 TABLET | Refills: 0 | Status: SHIPPED | OUTPATIENT
Start: 2025-02-04 | End: 2025-05-05

## 2025-02-04 NOTE — TELEPHONE ENCOUNTER
Patient is calling in because [facility] for her HTN check.  Please advise.

## 2025-02-24 ENCOUNTER — APPOINTMENT (OUTPATIENT)
Dept: PREADMISSION TESTING | Facility: HOSPITAL | Age: 50
End: 2025-02-24
Payer: COMMERCIAL

## 2025-02-28 ENCOUNTER — APPOINTMENT (OUTPATIENT)
Dept: PRIMARY CARE | Facility: CLINIC | Age: 50
End: 2025-02-28
Payer: COMMERCIAL

## 2025-02-28 VITALS
WEIGHT: 293 LBS | OXYGEN SATURATION: 95 % | BODY MASS INDEX: 50.02 KG/M2 | SYSTOLIC BLOOD PRESSURE: 144 MMHG | DIASTOLIC BLOOD PRESSURE: 80 MMHG | HEART RATE: 75 BPM | TEMPERATURE: 98.2 F | HEIGHT: 64 IN

## 2025-02-28 DIAGNOSIS — I10 PRIMARY HYPERTENSION: Primary | ICD-10-CM

## 2025-02-28 DIAGNOSIS — E66.01 MORBID OBESITY (MULTI): ICD-10-CM

## 2025-02-28 PROCEDURE — 99214 OFFICE O/P EST MOD 30 MIN: CPT

## 2025-02-28 PROCEDURE — 3077F SYST BP >= 140 MM HG: CPT

## 2025-02-28 PROCEDURE — 3008F BODY MASS INDEX DOCD: CPT

## 2025-02-28 PROCEDURE — 3079F DIAST BP 80-89 MM HG: CPT

## 2025-02-28 PROCEDURE — 1036F TOBACCO NON-USER: CPT

## 2025-02-28 RX ORDER — LISINOPRIL 40 MG/1
40 TABLET ORAL DAILY
Qty: 60 TABLET | Refills: 0 | Status: SHIPPED | OUTPATIENT
Start: 2025-02-28 | End: 2025-04-29

## 2025-02-28 ASSESSMENT — PAIN SCALES - GENERAL: PAINLEVEL_OUTOF10: 0-NO PAIN

## 2025-02-28 ASSESSMENT — PATIENT HEALTH QUESTIONNAIRE - PHQ9
SUM OF ALL RESPONSES TO PHQ9 QUESTIONS 1 AND 2: 0
2. FEELING DOWN, DEPRESSED OR HOPELESS: NOT AT ALL
1. LITTLE INTEREST OR PLEASURE IN DOING THINGS: NOT AT ALL

## 2025-02-28 NOTE — ASSESSMENT & PLAN NOTE
Chronic, needs better control. Increase Lisinopril to 40mg daily. Risks and benefits of medication discussed and prescribed. DASH diet and 30 minutes of exercise 5x/week. Check home BP and keep log. Recheck in 1 month or sooner if needed. Will complete CMP before next visit.     Orders:    Comprehensive metabolic panel; Future    lisinopril 40 mg tablet; Take 1 tablet (40 mg) by mouth once daily.

## 2025-02-28 NOTE — PROGRESS NOTES
"Subjective   Patient ID: Estephania De is a 49 y.o. female who presents for Hypertension and Weight Check (Has not been using Zepbound-  insurance does not cover).    HPI   HTN: On Lisinopril 20mg daily. No medication side effects. Has not been checking home BP consistently. Reports increase in stress, just lost job. Denies chest pain, heart palpitations, SOB, dizziness, headaches, changes of vision, syncope, leg swelling.     Obesity: Discussed starting Zepbound at last visit. Patient states insurance did not cover and did not start medication.     Review of Systems  All other systems have been reviewed and are negative except as noted in the HPI.     Objective   /80 (BP Location: Left arm, Patient Position: Sitting, BP Cuff Size: Large adult)   Pulse 75   Temp 36.8 °C (98.2 °F) (Temporal)   Ht 1.626 m (5' 4\")   Wt 141 kg (311 lb 3.2 oz)   SpO2 95%   BMI 53.42 kg/m²     Physical Exam  Vitals and nursing note reviewed.   Constitutional:       General: She is not in acute distress.     Appearance: Normal appearance. She is obese.   Eyes:      Extraocular Movements: Extraocular movements intact.      Conjunctiva/sclera: Conjunctivae normal.   Neck:      Vascular: No carotid bruit.   Cardiovascular:      Rate and Rhythm: Normal rate and regular rhythm.   Pulmonary:      Effort: Pulmonary effort is normal.      Breath sounds: Normal breath sounds.   Musculoskeletal:      Cervical back: Neck supple.      Right lower leg: No edema.      Left lower leg: No edema.   Neurological:      General: No focal deficit present.      Mental Status: She is alert.   Psychiatric:         Mood and Affect: Mood normal.         Assessment/Plan   Assessment & Plan  Primary hypertension  Chronic, needs better control. Increase Lisinopril to 40mg daily. Risks and benefits of medication discussed and prescribed. DASH diet and 30 minutes of exercise 5x/week. Check home BP and keep log. Recheck in 1 month or sooner if " needed. Will complete CMP before next visit.     Orders:    Comprehensive metabolic panel; Future    lisinopril 40 mg tablet; Take 1 tablet (40 mg) by mouth once daily.    Morbid obesity (Multi)  Chronic. Insurance does not cover GLP-1 for weight loss. Discussed referral to  weight mgmt program, patient declines at this time and would like to start with lifestyle modifications. Decrease fast food, fried food, processed foods, carbohydrates. Increase lean protein, vegetables and fiber. Exercise 30 minutes 4-5x/week.

## 2025-02-28 NOTE — ASSESSMENT & PLAN NOTE
Chronic. Insurance does not cover GLP-1 for weight loss. Discussed referral to  weight mgmt program, patient declines at this time and would like to start with lifestyle modifications. Decrease fast food, fried food, processed foods, carbohydrates. Increase lean protein, vegetables and fiber. Exercise 30 minutes 4-5x/week.

## 2025-03-27 ENCOUNTER — APPOINTMENT (OUTPATIENT)
Dept: PREADMISSION TESTING | Facility: HOSPITAL | Age: 50
End: 2025-03-27
Payer: COMMERCIAL

## 2025-03-31 ENCOUNTER — PRE-ADMISSION TESTING (OUTPATIENT)
Dept: PREADMISSION TESTING | Facility: HOSPITAL | Age: 50
End: 2025-03-31
Payer: COMMERCIAL

## 2025-03-31 VITALS
TEMPERATURE: 97.5 F | RESPIRATION RATE: 16 BRPM | DIASTOLIC BLOOD PRESSURE: 93 MMHG | SYSTOLIC BLOOD PRESSURE: 172 MMHG | HEIGHT: 65 IN | BODY MASS INDEX: 48.82 KG/M2 | OXYGEN SATURATION: 98 % | WEIGHT: 293 LBS | HEART RATE: 73 BPM

## 2025-03-31 DIAGNOSIS — E66.01 SEVERE OBESITY (BMI >= 40) (MULTI): ICD-10-CM

## 2025-03-31 DIAGNOSIS — Z01.818 PRE-OP TESTING: Primary | ICD-10-CM

## 2025-03-31 LAB
ALBUMIN SERPL BCP-MCNC: 4.4 G/DL (ref 3.4–5)
ALP SERPL-CCNC: 51 U/L (ref 33–110)
ALT SERPL W P-5'-P-CCNC: 23 U/L (ref 7–45)
ANION GAP SERPL CALCULATED.3IONS-SCNC: 9 MMOL/L (ref 10–20)
AST SERPL W P-5'-P-CCNC: 18 U/L (ref 9–39)
ATRIAL RATE: 64 BPM
BASOPHILS # BLD AUTO: 0.04 X10*3/UL (ref 0–0.1)
BASOPHILS NFR BLD AUTO: 0.8 %
BILIRUB SERPL-MCNC: 0.5 MG/DL (ref 0–1.2)
BUN SERPL-MCNC: 10 MG/DL (ref 6–23)
CALCIUM SERPL-MCNC: 9.4 MG/DL (ref 8.6–10.3)
CHLORIDE SERPL-SCNC: 105 MMOL/L (ref 98–107)
CO2 SERPL-SCNC: 29 MMOL/L (ref 21–32)
CREAT SERPL-MCNC: 0.7 MG/DL (ref 0.5–1.05)
EGFRCR SERPLBLD CKD-EPI 2021: >90 ML/MIN/1.73M*2
EOSINOPHIL # BLD AUTO: 0.07 X10*3/UL (ref 0–0.7)
EOSINOPHIL NFR BLD AUTO: 1.4 %
ERYTHROCYTE [DISTWIDTH] IN BLOOD BY AUTOMATED COUNT: 12.8 % (ref 11.5–14.5)
GLUCOSE SERPL-MCNC: 106 MG/DL (ref 74–99)
HCT VFR BLD AUTO: 43.8 % (ref 36–46)
HGB BLD-MCNC: 14.4 G/DL (ref 12–16)
IMM GRANULOCYTES # BLD AUTO: 0.01 X10*3/UL (ref 0–0.7)
IMM GRANULOCYTES NFR BLD AUTO: 0.2 % (ref 0–0.9)
LYMPHOCYTES # BLD AUTO: 1.6 X10*3/UL (ref 1.2–4.8)
LYMPHOCYTES NFR BLD AUTO: 32.3 %
MCH RBC QN AUTO: 29.3 PG (ref 26–34)
MCHC RBC AUTO-ENTMCNC: 32.9 G/DL (ref 32–36)
MCV RBC AUTO: 89 FL (ref 80–100)
MONOCYTES # BLD AUTO: 0.42 X10*3/UL (ref 0.1–1)
MONOCYTES NFR BLD AUTO: 8.5 %
NEUTROPHILS # BLD AUTO: 2.81 X10*3/UL (ref 1.2–7.7)
NEUTROPHILS NFR BLD AUTO: 56.8 %
NRBC BLD-RTO: 0 /100 WBCS (ref 0–0)
P AXIS: 35 DEGREES
P OFFSET: 204 MS
P ONSET: 147 MS
PLATELET # BLD AUTO: 221 X10*3/UL (ref 150–450)
POTASSIUM SERPL-SCNC: 4.4 MMOL/L (ref 3.5–5.3)
PR INTERVAL: 150 MS
PROT SERPL-MCNC: 7 G/DL (ref 6.4–8.2)
Q ONSET: 222 MS
QRS COUNT: 11 BEATS
QRS DURATION: 78 MS
QT INTERVAL: 384 MS
QTC CALCULATION(BAZETT): 396 MS
QTC FREDERICIA: 392 MS
R AXIS: 26 DEGREES
RBC # BLD AUTO: 4.92 X10*6/UL (ref 4–5.2)
SODIUM SERPL-SCNC: 139 MMOL/L (ref 136–145)
T AXIS: 40 DEGREES
T OFFSET: 414 MS
VENTRICULAR RATE: 64 BPM
WBC # BLD AUTO: 5 X10*3/UL (ref 4.4–11.3)

## 2025-03-31 PROCEDURE — 84075 ASSAY ALKALINE PHOSPHATASE: CPT

## 2025-03-31 PROCEDURE — 93010 ELECTROCARDIOGRAM REPORT: CPT | Performed by: INTERNAL MEDICINE

## 2025-03-31 PROCEDURE — 36415 COLL VENOUS BLD VENIPUNCTURE: CPT

## 2025-03-31 PROCEDURE — 93005 ELECTROCARDIOGRAM TRACING: CPT

## 2025-03-31 PROCEDURE — 85025 COMPLETE CBC W/AUTO DIFF WBC: CPT

## 2025-03-31 PROCEDURE — 99204 OFFICE O/P NEW MOD 45 MIN: CPT | Performed by: PHYSICIAN ASSISTANT

## 2025-03-31 ASSESSMENT — DUKE ACTIVITY SCORE INDEX (DASI)
CAN YOU DO HEAVY WORK AROUND THE HOUSE LIKE SCRUBBING FLOORS OR LIFTING AND MOVING HEAVY FURNITURE: YES
CAN YOU PARTICIPATE IN MODERATE RECREATIONAL ACTIVITIES LIKE GOLF, BOWLING, DANCING, DOUBLES TENNIS OR THROWING A BASEBALL OR FOOTBALL: YES
CAN YOU WALK INDOORS, SUCH AS AROUND YOUR HOUSE: YES
CAN YOU DO LIGHT WORK AROUND THE HOUSE LIKE DUSTING OR WASHING DISHES: YES
CAN YOU TAKE CARE OF YOURSELF (EAT, DRESS, BATHE, OR USE TOILET): YES
CAN YOU DO MODERATE WORK AROUND THE HOUSE LIKE VACUUMING, SWEEPING FLOORS OR CARRYING GROCERIES: YES
CAN YOU DO YARD WORK LIKE RAKING LEAVES, WEEDING OR PUSHING A MOWER: YES
CAN YOU WALK A BLOCK OR TWO ON LEVEL GROUND: YES
CAN YOU RUN A SHORT DISTANCE: YES
CAN YOU PARTICIPATE IN STRENOUS SPORTS LIKE SWIMMING, SINGLES TENNIS, FOOTBALL, BASKETBALL, OR SKIING: NO
TOTAL_SCORE: 50.7
DASI METS SCORE: 9
CAN YOU HAVE SEXUAL RELATIONS: YES
CAN YOU CLIMB A FLIGHT OF STAIRS OR WALK UP A HILL: YES

## 2025-03-31 ASSESSMENT — ENCOUNTER SYMPTOMS
ARTHRALGIAS: 1
ROS GI COMMENTS: SEE HPI

## 2025-03-31 NOTE — CPM/PAT H&P
"CPM/PAT Evaluation       Name: Estephania De (Estephania De)  /Age: 1975/49 y.o.     In-Person       Chief Complaint: \"having a colonoscopy\"    HPI  The patient is a 49 year old female.  She was recently seen by her PCP and a screening colonoscopy is recommended at this time.  Other than intermittent diarrhea she feels well from a gastrointestinal standpoint.  She denies abdominal pain, bloating, cramping, nausea, vomiting, constipation or melena.  Her appetite is good, weight is stable and she denies a family history of colon cancer.      Past Medical History:   Diagnosis Date    Abdominal pain 2024    Abscess of face 2022    Herniated lumbar intervertebral disc 2024    Hypertension     Obesity     Sebaceous cyst 2024    Tonsillar calculus 10/08/2021       Past Surgical History:   Procedure Laterality Date    HYSTERECTOMY  2012    KNEE SURGERY Right 1987    MASTOID SURGERY Bilateral      Family History   Problem Relation Name Age of Onset    Heart disease Mother      Heart disease Mother's Sister      Heart disease Mother's Brother      Heart disease Maternal Grandmother      Diabetes type II Maternal Grandfather       Social History     Tobacco Use    Smoking status: Former     Current packs/day: 0.00     Average packs/day: 0.5 packs/day for 27.0 years (13.5 ttl pk-yrs)     Types: Cigarettes     Start date:      Quit date: 2019     Years since quittin.2    Smokeless tobacco: Never   Substance Use Topics    Alcohol use: Not Currently     Social History     Substance and Sexual Activity   Drug Use Never     Allergies   Allergen Reactions    Amoxicillin Other    Iodine Rash     blisters     Current Outpatient Medications   Medication Sig Dispense Refill    cetirizine (ZyrTEC) 10 mg tablet Take 1 tablet (10 mg) by mouth once daily as needed for allergies.      lisinopril 40 mg tablet Take 1 tablet (40 mg) by mouth once daily. 60 tablet 0     No current " "facility-administered medications for this visit.     Review of Systems   Gastrointestinal:         See HPI   Musculoskeletal:  Positive for arthralgias.   All other systems reviewed and are negative.    BP (!) 172/93   Pulse 73   Temp 36.4 °C (97.5 °F) (Temporal)   Resp 16   Ht 1.645 m (5' 4.75\")   Wt 142 kg (313 lb)   SpO2 98%   BMI 52.49 kg/m²     Physical Exam  Vitals reviewed.   Constitutional:       Appearance: She is obese.   HENT:      Head: Normocephalic and atraumatic.      Mouth/Throat:      Mouth: Mucous membranes are moist.      Pharynx: Oropharynx is clear.   Eyes:      Extraocular Movements: Extraocular movements intact.      Pupils: Pupils are equal, round, and reactive to light.   Cardiovascular:      Rate and Rhythm: Normal rate and regular rhythm.   Pulmonary:      Effort: Pulmonary effort is normal.      Breath sounds: Normal breath sounds.   Abdominal:      General: Bowel sounds are normal.      Palpations: Abdomen is soft.   Musculoskeletal:         General: No swelling.   Skin:     General: Skin is warm and dry.   Neurological:      General: No focal deficit present.      Mental Status: She is alert and oriented to person, place, and time.   Psychiatric:         Mood and Affect: Mood normal.         Behavior: Behavior normal.          PAT AIRWAY:   Airway:     Mallampati::  III    TM distance::  >3 FB    Neck ROM::  Full   Teeth intact    ASA:  III  DASI SCORE:  50.7  METS SCORE:  9  CHAD2 SCORE:  2.8%  REVISED CARDIAC RISK INDEX:  0.4%  STOP BANG SCORE:  3  CAPRINI DVT SCORE:  5  ARISCAT SCORE:  1.6%    EKG (preliminary in PAT) - normal sinus rhythm  CBC, CMP ordered during PAT visit    Assessment and Plan:     Intermittent diarrhea:  Screening colonoscopy  Hypertension - taking lisinopril  Severe obesity - BMI: 52.49  Post-operative nausea and vomiting    Destinee Gil PA-C          "

## 2025-03-31 NOTE — PREPROCEDURE INSTRUCTIONS
PAT DISCHARGE INSTRUCTIONS    Please call the Same Day Surgery (SDS) Department of the hospital where your procedure will be performed after 2:00 PM the day before your surgery. If you are scheduled on a Monday, or a Tuesday following a Monday holiday, you will need to call on the last business day prior to your surgery.    [facility]  [address], [address]  [phone]    [facility]  [address] [place] [address] [phone]    [facility]  [address]  [place] [id number]  [phone]    Please let your surgeon know if:      You develop any open sores, shingles, burning or painful urination as these may increase your risk of an infection.   You no longer wish to have the surgery.   Any other personal circumstances change that may lead to the need to cancel or defer this surgery-such as being sick or getting admitted to any hospital within one week of your planned procedure.    Your contact details change, such as a change of address or phone number.    Starting now:     Please DO NOT drink alcohol or smoke for 24 hours before surgery. It is well known that quitting smoking can make a huge difference to your health and recovery from surgery. The longer you abstain from smoking, the better your chances of a healthy recovery. If you need help with quitting, call 1-800-QUIT-NOW to be connected to a trained counselor who will discuss the best methods to help you quit.     Before your surgery:    Please stop all supplements 7 days prior to surgery. Or as directed by your surgeon.   Please stop taking NSAID pain medicine such as Advil and Motrin 7 days before surgery.    If you develop any fever, cough, cold, rashes, cuts, scratches, scrapes, urinary symptoms or infection anywhere on your body (including teeth and gums) prior to surgery, please call your surgeon’s office as soon as  possible. This may require treatment to reduce the chance of cancellation on the day of surgery.    The day before your surgery:   Get a good night’s rest.  Use the special soap for bathing if you have been instructed to use one.    Scheduled surgery times may change and you will be notified if this occurs - please check your personal voicemail for any updates.     On the morning of surgery:   Wear comfortable, loose fitting clothes which open in the front. Please do not wear moisturizers, creams, lotions, makeup or perfume.    Please bring with you to surgery:   Photo ID and insurance card   Current list of medicines and allergies   Pacemaker/ Defibrillator/Heart stent cards   CPAP machine and mask    Slings/ splints/ crutches   A copy of your complete advanced directive/DHPOA.    Please do NOT bring with you to surgery:   All jewelry and valuables should be left at home.   Prosthetic devices such as contact lenses, hearing aids, dentures, eyelash extensions, hairpins and body piercings must be removed prior to going in to the surgical suite.    After outpatient surgery:   A responsible adult MUST accompany you at the time of discharge and stay with you for 24 hours after your surgery. You may NOT drive yourself home after surgery.    Do not drive, operate machinery, make critical decisions or do activities that require co-ordination or balance until after a night’s sleep.   Do not drink alcoholic beverages for 24 hours.   Instructions for resuming your medications will be provided by your surgeon.    CALL YOUR DOCTOR AFTER SURGERY IF YOU HAVE:     Chills and/or a fever of 101° F or higher.    Redness, swelling, pus or drainage from your surgical wound or a bad smell from the wound.    Lightheadedness, fainting or confusion.    Persistent vomiting (throwing up) and are not able to eat or drink for 12 hours.    Three or more loose, watery bowel movements in 24 hours (diarrhea).   Difficulty or pain while urinating(  after non-urological surgery)    Pain and swelling in your legs, especially if it is only on one side.    Difficulty breathing or are breathing faster than normal.    Any new concerning symptoms.    FOLLOW ALL INSTRUCTIONS GIVEN TO YOU BY DR CARBAJAL REGARDING BOWEL PREP AND MED INSTRUCTIONS     Medication List            Accurate as of March 31, 2025  9:28 AM. Always use your most recent med list.                cetirizine 10 mg tablet  Commonly known as: ZyrTEC  Medication Adjustments for Surgery: Take last dose 1 day (24 hours) before surgery     lisinopril 40 mg tablet  Take 1 tablet (40 mg) by mouth once daily.  Medication Adjustments for Surgery: Take last dose 1 day (24 hours) before surgery

## 2025-04-01 ENCOUNTER — APPOINTMENT (OUTPATIENT)
Dept: PRIMARY CARE | Facility: CLINIC | Age: 50
End: 2025-04-01
Payer: COMMERCIAL

## 2025-04-01 VITALS
DIASTOLIC BLOOD PRESSURE: 93 MMHG | WEIGHT: 293 LBS | HEART RATE: 69 BPM | OXYGEN SATURATION: 95 % | BODY MASS INDEX: 48.82 KG/M2 | HEIGHT: 65 IN | SYSTOLIC BLOOD PRESSURE: 138 MMHG

## 2025-04-01 DIAGNOSIS — I10 PRIMARY HYPERTENSION: Primary | ICD-10-CM

## 2025-04-01 PROCEDURE — 3008F BODY MASS INDEX DOCD: CPT

## 2025-04-01 PROCEDURE — 3075F SYST BP GE 130 - 139MM HG: CPT

## 2025-04-01 PROCEDURE — 3080F DIAST BP >= 90 MM HG: CPT

## 2025-04-01 PROCEDURE — G8433 SCR FOR DEP NOT CPT DOC RSN: HCPCS

## 2025-04-01 PROCEDURE — 1036F TOBACCO NON-USER: CPT

## 2025-04-01 PROCEDURE — 99213 OFFICE O/P EST LOW 20 MIN: CPT

## 2025-04-01 RX ORDER — HYDROCHLOROTHIAZIDE 12.5 MG/1
12.5 TABLET ORAL DAILY
Qty: 60 TABLET | Refills: 0 | Status: SHIPPED | OUTPATIENT
Start: 2025-04-01 | End: 2025-05-31

## 2025-04-01 ASSESSMENT — PATIENT HEALTH QUESTIONNAIRE - PHQ9
1. LITTLE INTEREST OR PLEASURE IN DOING THINGS: NOT AT ALL
2. FEELING DOWN, DEPRESSED OR HOPELESS: NOT AT ALL
SUM OF ALL RESPONSES TO PHQ9 QUESTIONS 1 AND 2: 0

## 2025-04-01 ASSESSMENT — COLUMBIA-SUICIDE SEVERITY RATING SCALE - C-SSRS
2. HAVE YOU ACTUALLY HAD ANY THOUGHTS OF KILLING YOURSELF?: NO
6. HAVE YOU EVER DONE ANYTHING, STARTED TO DO ANYTHING, OR PREPARED TO DO ANYTHING TO END YOUR LIFE?: NO
1. IN THE PAST MONTH, HAVE YOU WISHED YOU WERE DEAD OR WISHED YOU COULD GO TO SLEEP AND NOT WAKE UP?: NO

## 2025-04-01 NOTE — ASSESSMENT & PLAN NOTE
Chronic, needs better control. Continue Lisinopril 40mg daily. Start hydrochlorothiazide 12.5mg daily. Risks and benefits of medication discussed and prescribed. DASH diet and 30 minutes of exercise 5x/week. Check home BP and keep log. Recheck in 1 month.     Orders:    hydroCHLOROthiazide (Microzide) 12.5 mg tablet; Take 1 tablet (12.5 mg) by mouth once daily.

## 2025-04-01 NOTE — PROGRESS NOTES
"Subjective   Patient ID: Estephania De is a 49 y.o. female who presents for Hypertension.    HPI   HTN: On Lisinopril 40mg daily. No medication side effects. Home -150/90's. Denies chest pain, heart palpitations, SOB, dizziness, headaches, changes of vision, syncope, leg swelling.     Review of Systems  All other systems have been reviewed and are negative except as noted in the HPI.     Objective   BP (!) 138/93 (BP Location: Left arm, Patient Position: Sitting, BP Cuff Size: Adult)   Pulse 69   Ht 1.645 m (5' 4.75\")   Wt 143 kg (315 lb)   SpO2 95%   BMI 52.82 kg/m²     Physical Exam  Vitals and nursing note reviewed.   Constitutional:       General: She is not in acute distress.     Appearance: Normal appearance. She is obese.   Eyes:      Extraocular Movements: Extraocular movements intact.      Conjunctiva/sclera: Conjunctivae normal.   Neck:      Vascular: No carotid bruit.   Cardiovascular:      Rate and Rhythm: Normal rate and regular rhythm.   Pulmonary:      Effort: Pulmonary effort is normal.      Breath sounds: Normal breath sounds.   Musculoskeletal:      Right lower leg: No edema.      Left lower leg: No edema.   Neurological:      General: No focal deficit present.      Mental Status: She is alert.   Psychiatric:         Mood and Affect: Mood normal.         Assessment/Plan   Assessment & Plan  Primary hypertension  Chronic, needs better control. Continue Lisinopril 40mg daily. Start hydrochlorothiazide 12.5mg daily. Risks and benefits of medication discussed and prescribed. DASH diet and 30 minutes of exercise 5x/week. Check home BP and keep log. Recheck in 1 month.     Orders:    hydroCHLOROthiazide (Microzide) 12.5 mg tablet; Take 1 tablet (12.5 mg) by mouth once daily.         "

## 2025-04-03 ENCOUNTER — ANESTHESIA (OUTPATIENT)
Dept: GASTROENTEROLOGY | Facility: HOSPITAL | Age: 50
End: 2025-04-03
Payer: COMMERCIAL

## 2025-04-03 ENCOUNTER — ANESTHESIA EVENT (OUTPATIENT)
Dept: GASTROENTEROLOGY | Facility: HOSPITAL | Age: 50
End: 2025-04-03
Payer: COMMERCIAL

## 2025-04-03 ENCOUNTER — HOSPITAL ENCOUNTER (OUTPATIENT)
Dept: GASTROENTEROLOGY | Facility: HOSPITAL | Age: 50
Discharge: HOME | End: 2025-04-03
Payer: COMMERCIAL

## 2025-04-03 VITALS
OXYGEN SATURATION: 96 % | HEART RATE: 66 BPM | WEIGHT: 293 LBS | BODY MASS INDEX: 48.82 KG/M2 | HEIGHT: 65 IN | SYSTOLIC BLOOD PRESSURE: 135 MMHG | RESPIRATION RATE: 16 BRPM | TEMPERATURE: 96.8 F | DIASTOLIC BLOOD PRESSURE: 80 MMHG

## 2025-04-03 DIAGNOSIS — Z12.11 COLON CANCER SCREENING: ICD-10-CM

## 2025-04-03 PROCEDURE — 7100000009 HC PHASE TWO TIME - INITIAL BASE CHARGE

## 2025-04-03 PROCEDURE — 7100000010 HC PHASE TWO TIME - EACH INCREMENTAL 1 MINUTE

## 2025-04-03 PROCEDURE — A45378 PR COLONOSCOPY,DIAGNOSTIC: Performed by: ANESTHESIOLOGY

## 2025-04-03 PROCEDURE — 3700000002 HC GENERAL ANESTHESIA TIME - EACH INCREMENTAL 1 MINUTE

## 2025-04-03 PROCEDURE — A45378 PR COLONOSCOPY,DIAGNOSTIC: Performed by: ANESTHESIOLOGIST ASSISTANT

## 2025-04-03 PROCEDURE — 45378 DIAGNOSTIC COLONOSCOPY: CPT | Performed by: INTERNAL MEDICINE

## 2025-04-03 PROCEDURE — 2500000004 HC RX 250 GENERAL PHARMACY W/ HCPCS (ALT 636 FOR OP/ED): Performed by: ANESTHESIOLOGIST ASSISTANT

## 2025-04-03 PROCEDURE — 3700000001 HC GENERAL ANESTHESIA TIME - INITIAL BASE CHARGE

## 2025-04-03 PROCEDURE — 7100000002 HC RECOVERY ROOM TIME - EACH INCREMENTAL 1 MINUTE

## 2025-04-03 PROCEDURE — 7100000001 HC RECOVERY ROOM TIME - INITIAL BASE CHARGE

## 2025-04-03 RX ORDER — IPRATROPIUM BROMIDE 0.5 MG/2.5ML
500 SOLUTION RESPIRATORY (INHALATION) ONCE
OUTPATIENT
Start: 2025-04-03 | End: 2025-04-03

## 2025-04-03 RX ORDER — HYDRALAZINE HYDROCHLORIDE 20 MG/ML
5 INJECTION INTRAMUSCULAR; INTRAVENOUS EVERY 30 MIN PRN
OUTPATIENT
Start: 2025-04-03

## 2025-04-03 RX ORDER — FENTANYL CITRATE 50 UG/ML
INJECTION, SOLUTION INTRAMUSCULAR; INTRAVENOUS AS NEEDED
Status: DISCONTINUED | OUTPATIENT
Start: 2025-04-03 | End: 2025-04-03

## 2025-04-03 RX ORDER — HYDROMORPHONE HYDROCHLORIDE 0.2 MG/ML
0.2 INJECTION INTRAMUSCULAR; INTRAVENOUS; SUBCUTANEOUS EVERY 5 MIN PRN
OUTPATIENT
Start: 2025-04-03

## 2025-04-03 RX ORDER — ONDANSETRON HYDROCHLORIDE 2 MG/ML
4 INJECTION, SOLUTION INTRAVENOUS ONCE AS NEEDED
OUTPATIENT
Start: 2025-04-03

## 2025-04-03 RX ORDER — MIDAZOLAM HYDROCHLORIDE 1 MG/ML
INJECTION, SOLUTION INTRAMUSCULAR; INTRAVENOUS AS NEEDED
Status: DISCONTINUED | OUTPATIENT
Start: 2025-04-03 | End: 2025-04-03

## 2025-04-03 RX ORDER — DIPHENHYDRAMINE HYDROCHLORIDE 50 MG/ML
12.5 INJECTION, SOLUTION INTRAMUSCULAR; INTRAVENOUS ONCE AS NEEDED
OUTPATIENT
Start: 2025-04-03

## 2025-04-03 RX ORDER — PROPOFOL 10 MG/ML
INJECTION, EMULSION INTRAVENOUS AS NEEDED
Status: DISCONTINUED | OUTPATIENT
Start: 2025-04-03 | End: 2025-04-03

## 2025-04-03 RX ORDER — ALBUTEROL SULFATE 0.83 MG/ML
2.5 SOLUTION RESPIRATORY (INHALATION) ONCE AS NEEDED
OUTPATIENT
Start: 2025-04-03

## 2025-04-03 RX ORDER — OXYCODONE HYDROCHLORIDE 5 MG/1
5 TABLET ORAL EVERY 4 HOURS PRN
OUTPATIENT
Start: 2025-04-03

## 2025-04-03 RX ADMIN — PROPOFOL 30 MG: 10 INJECTION, EMULSION INTRAVENOUS at 08:45

## 2025-04-03 RX ADMIN — SODIUM CHLORIDE, POTASSIUM CHLORIDE, SODIUM LACTATE AND CALCIUM CHLORIDE: 600; 310; 30; 20 INJECTION, SOLUTION INTRAVENOUS at 08:39

## 2025-04-03 RX ADMIN — FENTANYL CITRATE 100 MCG: 50 INJECTION, SOLUTION INTRAMUSCULAR; INTRAVENOUS at 08:39

## 2025-04-03 RX ADMIN — PROPOFOL 40 MG: 10 INJECTION, EMULSION INTRAVENOUS at 08:48

## 2025-04-03 RX ADMIN — PROPOFOL 60 MG: 10 INJECTION, EMULSION INTRAVENOUS at 08:53

## 2025-04-03 RX ADMIN — MIDAZOLAM 2 MG: 1 INJECTION INTRAMUSCULAR; INTRAVENOUS at 08:39

## 2025-04-03 RX ADMIN — PROPOFOL 70 MG: 10 INJECTION, EMULSION INTRAVENOUS at 08:42

## 2025-04-03 SDOH — HEALTH STABILITY: MENTAL HEALTH: CURRENT SMOKER: 0

## 2025-04-03 ASSESSMENT — PAIN SCALES - GENERAL
PAINLEVEL_OUTOF10: 0 - NO PAIN
PAIN_LEVEL: 0
PAINLEVEL_OUTOF10: 2

## 2025-04-03 ASSESSMENT — PAIN - FUNCTIONAL ASSESSMENT
PAIN_FUNCTIONAL_ASSESSMENT: 0-10

## 2025-04-03 ASSESSMENT — PAIN DESCRIPTION - DESCRIPTORS: DESCRIPTORS: ACHING

## 2025-04-03 ASSESSMENT — COLUMBIA-SUICIDE SEVERITY RATING SCALE - C-SSRS
1. IN THE PAST MONTH, HAVE YOU WISHED YOU WERE DEAD OR WISHED YOU COULD GO TO SLEEP AND NOT WAKE UP?: NO
2. HAVE YOU ACTUALLY HAD ANY THOUGHTS OF KILLING YOURSELF?: NO
6. HAVE YOU EVER DONE ANYTHING, STARTED TO DO ANYTHING, OR PREPARED TO DO ANYTHING TO END YOUR LIFE?: NO

## 2025-04-03 NOTE — NURSING NOTE
Pt sitting up .  Tolerates sips of water and crackers.  Discharge instructions discussed and handed to pt. No further questions.

## 2025-04-03 NOTE — ANESTHESIA POSTPROCEDURE EVALUATION
Patient: Estephania De    Procedure Summary       Date: 04/03/25 Room / Location: [facility]    Anesthesia Start: 0840 Anesthesia Stop: 0901    Procedure: COLONOSCOPY Diagnosis: Colon cancer screening    Scheduled Providers: Srinivasan Hernandez MD; DYLAN Melgar; Mauricio Espinal MD Responsible Provider: Mauricio Espinal MD    Anesthesia Type: MAC ASA Status: 3            Anesthesia Type: MAC    Vitals Value Taken Time   /72 04/03/25 0910   Temp 35.7 °C (96.3 °F) 04/03/25 0858   Pulse 77 04/03/25 0910   Resp 17 04/03/25 0910   SpO2 93 % 04/03/25 0910       Anesthesia Post Evaluation    Patient location during evaluation: PACU  Patient participation: complete - patient participated  Level of consciousness: awake  Pain score: 0  Pain management: adequate  Multimodal analgesia pain management approach  Airway patency: patent  Two or more strategies used to mitigate risk of obstructive sleep apnea  Cardiovascular status: acceptable  Respiratory status: acceptable  Hydration status: acceptable  Postoperative Nausea and Vomiting: none        There were no known notable events for this encounter.

## 2025-04-03 NOTE — SIGNIFICANT EVENT
Bedside report to Torri RN to assume care of the pt. All questions answered. POC to be maintained. Status stable

## 2025-04-03 NOTE — DISCHARGE INSTRUCTIONS
Instructions  Patient Instructions after a Colonoscopy, Upper GI, and ERCP      The anesthetics, sedatives or narcotics which were given to you today will be acting in your body for the next 24 hours, so you might feel a little sleepy or groggy.  This feeling should slowly wear off. Carefully read and follow the instructions.      You received sedation today:  - Do not drive or operate any machinery or power tools of any kind.   - No alcoholic beverages today, not even beer or wine.  - Do not make any important decisions or sign any legal documents.  - No over the counter medications that contain alcohol or that may cause drowsiness.  - Do not make any important decisions or sign any legal documents.     While it is common to experience mild to moderate abdominal distention, gas, or belching after your procedure, if any of these symptoms occur following discharge from the GI Lab or within one week of having your procedure, call the [facility] to be advised whether a visit to your nearest Urgent Care or Emergency Department is indicated.  Take this paper with you if you go.      - If you develop an allergic reaction to the medications that were given during your procedure such as difficulty breathing, rash, hives, severe nausea, vomiting or lightheadedness.- If you experience chest pain, shortness of breath, severe abdominal pain, fevers and chills.     -If you develop signs and symptoms of bleeding such as blood in your spit, if your stools turn black, tarry, or bloody     - If you have not urinated within 8 hours following your procedure.- If your IV site becomes painful, red, inflamed, or looks infected.     If you received a biopsy/polypectomy/sphincterotomy the following instructions apply below:     - Do not use Aspirin containing products, non-steroidal medications or anti-coagulants for one week following your procedure. (Examples of these types of medications are: Advil, Arthrotec, Aleve,  Coumadin, Ecotrin, Heparin, Ibuprofen, Indocin, Motrin, Naprosyn, Nuprin, Plavix, Vioxx, and Voltarin, or their generic forms.  This list is not all-inclusive.  Check with your physician or pharmacist before resuming medications.)      - Eat a soft diet today.  Avoid foods that are poorly digested for the next 24 hours.  These foods would include: nuts, beans, lettuce, red meats, and fried foods.       - Start with liquids and advance your diet as tolerated, gradually work up to eating solids.      - Do not have a Barium Study or Enema for one week.     Your physician recommends the additional following instructions:     Colonoscopy: Resume your previous diet, continue your present medications, a repeated colonoscopy will be determined based on pathology result. Return to normal activity tomorrow.      Upper GI endoscopy: Resume your previous diet, continue your medications, recommendation to repeat upper endoscopy in three months for follow up of Thacker's ablation. Return to normal activity tomorrow.      ERCP: Recommended a repeat ERCP in eight weeks to exchange a stent. Watch for symptoms of pancreatitis, bleeding, perforation and cholangitis.  Please see Medication Reconciliation Form for new medication/medications prescribed.     If you experience any problems or have any questions following discharge from the GI Lab, please call:  Before 5p.m.  [phone]  After 5p.m.    [phone]     Nurse Signature                                                                        Date___________________                                                                            Patient/Responsible Party Signature                                        Date___________________

## 2025-04-03 NOTE — ANESTHESIA PREPROCEDURE EVALUATION
Patient: Estephania De    Procedure Information       Date/Time: 04/03/25 0830    Scheduled providers: Srinivasan Hernandez MD; DYLAN Melgar; Mauricio Espinal MD    Procedure: COLONOSCOPY    Location: [facility]            Relevant Problems   Anesthesia (within normal limits)      Cardiac   (+) HTN (hypertension)      Pulmonary (within normal limits)      Neuro (within normal limits)      GI (within normal limits)      /Renal (within normal limits)      Liver (within normal limits)      Endocrine   (+) Morbid obesity (Multi)      Hematology (within normal limits)      Musculoskeletal (within normal limits)      HEENT (within normal limits)      ID (within normal limits)      Skin (within normal limits)      GYN (within normal limits)       Clinical information reviewed:   Tobacco  Allergies  Meds   Med Hx  Surg Hx  OB Status  Fam Hx  Soc   Hx      Vitals:    04/03/25 0733   BP: 142/84   Pulse: 76   Resp: 18   Temp: 36 °C (96.8 °F)   SpO2: 96%       Past Surgical History:   Procedure Laterality Date    HYSTERECTOMY  2012    KNEE SURGERY Right 1987    MASTOID SURGERY Bilateral 1976     Past Medical History:   Diagnosis Date    Abdominal pain 11/22/2024    Abscess of face 06/02/2022    Herniated lumbar intervertebral disc 11/22/2024    Hypertension     Obesity     PONV (postoperative nausea and vomiting)     Sebaceous cyst 11/22/2024    Tonsillar calculus 10/08/2021       Current Outpatient Medications:     cetirizine (ZyrTEC) 10 mg tablet, Take 1 tablet (10 mg) by mouth once daily as needed for allergies., Disp: , Rfl:     hydroCHLOROthiazide (Microzide) 12.5 mg tablet, Take 1 tablet (12.5 mg) by mouth once daily. (Patient taking differently: Take 1 tablet (12.5 mg) by mouth once daily. Has not started), Disp: 60 tablet, Rfl: 0    lisinopril 40 mg tablet, Take 1 tablet (40 mg) by mouth once daily. (Patient taking differently: Take 1 tablet (40 mg) by mouth once daily. Last dose  "25), Disp: 60 tablet, Rfl: 0  Prior to Admission medications    Medication Sig Start Date End Date Taking? Authorizing Provider   cetirizine (ZyrTEC) 10 mg tablet Take 1 tablet (10 mg) by mouth once daily as needed for allergies.   Yes Historical Provider, MD   hydroCHLOROthiazide (Microzide) 12.5 mg tablet Take 1 tablet (12.5 mg) by mouth once daily.  Patient taking differently: Take 1 tablet (12.5 mg) by mouth once daily. Has not started 25  Shandra Webb PA-C   lisinopril 40 mg tablet Take 1 tablet (40 mg) by mouth once daily.  Patient taking differently: Take 1 tablet (40 mg) by mouth once daily. Last dose 25  Shandra Webb PA-C     Allergies   Allergen Reactions    Amoxicillin Other    Iodine Rash     blisters     Social History     Tobacco Use    Smoking status: Former     Current packs/day: 0.00     Average packs/day: 0.5 packs/day for 27.0 years (13.5 ttl pk-yrs)     Types: Cigarettes     Start date:      Quit date:      Years since quittin.2    Smokeless tobacco: Never   Substance Use Topics    Alcohol use: Not Currently         Chemistry    Lab Results   Component Value Date/Time     2025 0941    K 4.4 2025 0941     2025 0941    CO2 29 2025 0941    BUN 10 2025 0941    CREATININE 0.70 2025 0941    Lab Results   Component Value Date/Time    CALCIUM 9.4 2025 0941    ALKPHOS 51 2025 0941    AST 18 2025 0941    ALT 23 2025 0941    BILITOT 0.5 2025 0941          Lab Results   Component Value Date/Time    WBC 5.0 2025 0941    HGB 14.4 2025 0941    HCT 43.8 2025 0941     2025 0941     No results found for: \"PROTIME\", \"PTT\", \"INR\"  Encounter Date: 25   ECG 12 Lead   Result Value    Ventricular Rate 64    Atrial Rate 64    OR Interval 150    QRS Duration 78    QT Interval 384    QTC Calculation(Bazett) 396    P Axis 35    R Axis 26    T Axis 40    QRS " Count 11    Q Onset 222    P Onset 147    P Offset 204    T Offset 414    QTC Fredericia 392    Narrative    Normal sinus rhythm  Normal ECG  When compared with ECG of 16-JUN-2024 16:10,  Nonspecific T wave abnormality no longer evident in Inferior leads  QT has shortened  Confirmed by Aurelio Song ([id number]) on 3/31/2025 2:09:45 PM       NPO Detail:  NPO/Void Status  Date of Last Liquid: 04/02/25  Time of Last Liquid: 2300  Date of Last Solid: 04/01/25  Time of Last Solid: 1900  Last Intake Type: Clear fluids  Time of Last Void: 0710         Physical Exam    Airway  Mallampati: II  TM distance: >3 FB  Neck ROM: full     Cardiovascular    Dental - normal exam     Pulmonary    Abdominal            Anesthesia Plan    History of general anesthesia?: yes  History of complications of general anesthesia?: no    ASA 3     MAC     The patient is not a current smoker.  Patient was not previously instructed to abstain from smoking on day of procedure.  Patient did not smoke on day of procedure.  Education provided regarding risk of obstructive sleep apnea.  intravenous induction   Anesthetic plan and risks discussed with patient.    Plan discussed with CRNA and CAA.

## 2025-04-11 ENCOUNTER — HOSPITAL ENCOUNTER (OUTPATIENT)
Dept: RADIOLOGY | Facility: HOSPITAL | Age: 50
Discharge: HOME | End: 2025-04-11
Payer: COMMERCIAL

## 2025-04-11 DIAGNOSIS — E78.00 PURE HYPERCHOLESTEROLEMIA: ICD-10-CM

## 2025-04-11 PROCEDURE — 75571 CT HRT W/O DYE W/CA TEST: CPT

## 2025-05-05 ENCOUNTER — TELEPHONE (OUTPATIENT)
Dept: PRIMARY CARE | Facility: CLINIC | Age: 50
End: 2025-05-05

## 2025-05-05 ENCOUNTER — APPOINTMENT (OUTPATIENT)
Dept: PRIMARY CARE | Facility: CLINIC | Age: 50
End: 2025-05-05
Payer: COMMERCIAL

## 2025-05-05 VITALS
WEIGHT: 293 LBS | HEART RATE: 77 BPM | BODY MASS INDEX: 52.8 KG/M2 | SYSTOLIC BLOOD PRESSURE: 142 MMHG | OXYGEN SATURATION: 98 % | DIASTOLIC BLOOD PRESSURE: 87 MMHG | TEMPERATURE: 97.2 F

## 2025-05-05 DIAGNOSIS — R16.0 HYPODENSE MASS OF LEFT LOBE OF LIVER: ICD-10-CM

## 2025-05-05 DIAGNOSIS — I10 PRIMARY HYPERTENSION: Primary | ICD-10-CM

## 2025-05-05 PROCEDURE — 3077F SYST BP >= 140 MM HG: CPT

## 2025-05-05 PROCEDURE — 3079F DIAST BP 80-89 MM HG: CPT

## 2025-05-05 PROCEDURE — 99213 OFFICE O/P EST LOW 20 MIN: CPT

## 2025-05-05 PROCEDURE — 1036F TOBACCO NON-USER: CPT

## 2025-05-05 RX ORDER — HYDROCHLOROTHIAZIDE 25 MG/1
25 TABLET ORAL DAILY
Qty: 90 TABLET | Refills: 0 | Status: SHIPPED | OUTPATIENT
Start: 2025-05-05 | End: 2025-08-03

## 2025-05-05 RX ORDER — LISINOPRIL 40 MG/1
40 TABLET ORAL DAILY
Qty: 90 TABLET | Refills: 0 | Status: SHIPPED | OUTPATIENT
Start: 2025-05-05 | End: 2025-08-03

## 2025-05-05 ASSESSMENT — PATIENT HEALTH QUESTIONNAIRE - PHQ9
SUM OF ALL RESPONSES TO PHQ9 QUESTIONS 1 AND 2: 0
1. LITTLE INTEREST OR PLEASURE IN DOING THINGS: NOT AT ALL
2. FEELING DOWN, DEPRESSED OR HOPELESS: NOT AT ALL

## 2025-05-05 ASSESSMENT — PAIN SCALES - GENERAL: PAINLEVEL_OUTOF10: 4

## 2025-05-05 NOTE — PROGRESS NOTES
Subjective   Patient ID: Estephania De is a 49 y.o. female who presents for Follow-up (BP check. Pt reports that she has been monitoring her bp at home and it has consistently been elevated.).    HPI   HTN: On Lisinopril 40mg daily, hydrochlorothiazide 12.5mg daily. No medication side effects. Checked home BP a few times, >140/90. Has been trying to cut back on caffeine, salt. Denies chest pain, heart palpitations, SOB, dizziness, headaches, changes of vision, syncope, leg swelling.     Review of Systems  All other systems have been reviewed and are negative except as noted in the HPI.     Objective   /87   Pulse 77   Temp 36.2 °C (97.2 °F) (Temporal)   Wt 143 kg (315 lb)   SpO2 98%   BMI 52.80 kg/m²     Physical Exam  Vitals and nursing note reviewed.   Constitutional:       General: She is not in acute distress.     Appearance: Normal appearance. She is obese.   Eyes:      Extraocular Movements: Extraocular movements intact.      Conjunctiva/sclera: Conjunctivae normal.   Neck:      Vascular: No carotid bruit.   Cardiovascular:      Rate and Rhythm: Normal rate and regular rhythm.   Pulmonary:      Effort: Pulmonary effort is normal.      Breath sounds: Normal breath sounds.   Musculoskeletal:      Cervical back: Neck supple.      Right lower leg: No edema.      Left lower leg: No edema.   Neurological:      General: No focal deficit present.      Mental Status: She is alert.   Psychiatric:         Mood and Affect: Mood normal.       Assessment/Plan   Assessment & Plan  Primary hypertension  Chronic, needs better control. Continue lisinopril 40 mg daily.  Increase hydrochlorothiazide to 25 mg daily. DASH diet and 30 minutes of exercise 5x/week. Check home BP and keep log. Recheck in 1 month.  Patient is to call office sooner for persistent readings greater than 140/90.     Orders:    hydroCHLOROthiazide (HYDRODiuril) 25 mg tablet; Take 1 tablet (25 mg) by mouth once daily.    lisinopril 40 mg  tablet; Take 1 tablet (40 mg) by mouth once daily.    Hypodense mass of left lobe of liver  Acute, incidental finding.  Ultrasound liver for further evaluation, will follow-up with results.    Orders:    US abdomen limited liver; Future

## 2025-05-05 NOTE — ASSESSMENT & PLAN NOTE
Chronic, needs better control. Continue lisinopril 40 mg daily.  Increase hydrochlorothiazide to 25 mg daily. DASH diet and 30 minutes of exercise 5x/week. Check home BP and keep log. Recheck in 1 month.  Patient is to call office sooner for persistent readings greater than 140/90.     Orders:    hydroCHLOROthiazide (HYDRODiuril) 25 mg tablet; Take 1 tablet (25 mg) by mouth once daily.    lisinopril 40 mg tablet; Take 1 tablet (40 mg) by mouth once daily.

## 2025-05-08 ENCOUNTER — HOSPITAL ENCOUNTER (OUTPATIENT)
Dept: RADIOLOGY | Facility: HOSPITAL | Age: 50
Discharge: HOME | End: 2025-05-08
Payer: COMMERCIAL

## 2025-05-08 DIAGNOSIS — R16.0 HYPODENSE MASS OF LEFT LOBE OF LIVER: ICD-10-CM

## 2025-05-08 PROCEDURE — 76705 ECHO EXAM OF ABDOMEN: CPT | Performed by: STUDENT IN AN ORGANIZED HEALTH CARE EDUCATION/TRAINING PROGRAM

## 2025-05-08 PROCEDURE — 76705 ECHO EXAM OF ABDOMEN: CPT

## 2025-06-05 ENCOUNTER — TELEPHONE (OUTPATIENT)
Dept: PRIMARY CARE | Facility: CLINIC | Age: 50
End: 2025-06-05

## 2025-06-05 ENCOUNTER — APPOINTMENT (OUTPATIENT)
Dept: PRIMARY CARE | Facility: CLINIC | Age: 50
End: 2025-06-05
Payer: COMMERCIAL

## 2025-06-05 VITALS
BODY MASS INDEX: 51.46 KG/M2 | OXYGEN SATURATION: 97 % | SYSTOLIC BLOOD PRESSURE: 124 MMHG | WEIGHT: 293 LBS | TEMPERATURE: 97.3 F | DIASTOLIC BLOOD PRESSURE: 84 MMHG | HEART RATE: 82 BPM

## 2025-06-05 DIAGNOSIS — E78.00 PURE HYPERCHOLESTEROLEMIA: ICD-10-CM

## 2025-06-05 DIAGNOSIS — I10 PRIMARY HYPERTENSION: ICD-10-CM

## 2025-06-05 DIAGNOSIS — I10 PRIMARY HYPERTENSION: Primary | ICD-10-CM

## 2025-06-05 PROCEDURE — 3079F DIAST BP 80-89 MM HG: CPT

## 2025-06-05 PROCEDURE — 99213 OFFICE O/P EST LOW 20 MIN: CPT

## 2025-06-05 PROCEDURE — 1036F TOBACCO NON-USER: CPT

## 2025-06-05 PROCEDURE — 3074F SYST BP LT 130 MM HG: CPT

## 2025-06-05 RX ORDER — PHENYLPROPANOLAMINE/CLEMASTINE 75-1.34MG
200 TABLET, EXTENDED RELEASE ORAL AS NEEDED
COMMUNITY

## 2025-06-05 ASSESSMENT — PAIN SCALES - GENERAL: PAINLEVEL_OUTOF10: 4

## 2025-06-05 NOTE — PROGRESS NOTES
Subjective   Patient ID: Estephania De is a 50 y.o. female who presents for Follow-up (BP check.).    HPI   HTN: On lisinopril 40 mg, hydrochlorothiazide 25 mg daily. No medication side effects. Home -140's/80-90's. Denies chest pain, heart palpitations, SOB, dizziness, headaches, changes of vision, syncope, leg swelling.     Review of Systems  All other systems have been reviewed and are negative except as noted in the HPI.     Objective   /84 (BP Location: Left arm)   Pulse 82   Temp 36.3 °C (97.3 °F) (Temporal)   Wt 139 kg (307 lb)   SpO2 97%   BMI 51.46 kg/m²     Physical Exam  Vitals and nursing note reviewed.   Constitutional:       General: She is not in acute distress.     Appearance: Normal appearance.   Eyes:      Extraocular Movements: Extraocular movements intact.      Conjunctiva/sclera: Conjunctivae normal.   Neck:      Vascular: No carotid bruit.   Cardiovascular:      Rate and Rhythm: Normal rate and regular rhythm.   Pulmonary:      Effort: Pulmonary effort is normal.      Breath sounds: Normal breath sounds.   Musculoskeletal:      Right lower leg: No edema.      Left lower leg: No edema.   Neurological:      General: No focal deficit present.      Mental Status: She is alert.   Psychiatric:         Mood and Affect: Mood normal.         Assessment/Plan   Assessment & Plan  Primary hypertension  Chronic.  Recheck BP by me WNL.  Continue lisinopril 40 mg, hydrochlorothiazide 25 mg daily. DASH diet and 30 minutes of exercise 5x/week. Check home BP and keep log. Recheck in 1 month, instructed patient to bring blood pressure cuff to visit so we can calibrate in office as her home blood pressure cuff may be giving higher readings.

## 2025-06-05 NOTE — ASSESSMENT & PLAN NOTE
Chronic.  Recheck BP by me WNL.  Continue lisinopril 40 mg, hydrochlorothiazide 25 mg daily. DASH diet and 30 minutes of exercise 5x/week. Check home BP and keep log. Recheck in 1 month, instructed patient to bring blood pressure cuff to visit so we can calibrate in office as her home blood pressure cuff may be giving higher readings.

## 2025-06-24 ENCOUNTER — TELEPHONE (OUTPATIENT)
Dept: PRIMARY CARE | Facility: CLINIC | Age: 50
End: 2025-06-24
Payer: COMMERCIAL

## 2025-06-24 NOTE — TELEPHONE ENCOUNTER
Patient called and stated she wants to know if she's having a side effect of Hydrochlorothiazide she's dizzy and has nausea since 06-18-25. Patient went to [facility] ER and was admitted for two days for dizziness and vomiting. Patient stated she still has nausea and dizziness. She wants to know if she should stop taking the medication. Please advise       Patient can be reached at [phone]    I spoke to the patient.  She states she is having vertigo type symptoms.  She was in the hospital [facility] and was treated for vertigo.  She said she did not have her lisinopril or hydrochlorothiazide at the hospital for the first day but on the last day of her admission they gave it to her in the made no comment about stopping the medication.  She states that her blood pressure at the hospital is running as sometimes as low as 120s over 80.  She has blood pressure at home at home but she has not been checking it.  I told her that I did not think that her dizziness was due to the hydrochlorothiazide so I recommend that she continue taking it but that she check her blood pressure with her home BP machine.  If it starts running low she is to let us know.  Otherwise she is to keep on taking it and keep her appoint with the neurologist next week as set up at the [facility] after her hospitalization for vertigo.

## 2025-07-07 ENCOUNTER — TELEPHONE (OUTPATIENT)
Dept: PRIMARY CARE | Facility: CLINIC | Age: 50
End: 2025-07-07
Payer: COMMERCIAL

## 2025-07-07 NOTE — TELEPHONE ENCOUNTER
Patient called for an antibiotic for vertigo due to having an ear infection. Patient was offered an appointment and she stated she's unable to drive.    Please advise    Patient was advised that TRP is out of the office    Patient can be reached at [phone]

## 2025-07-08 ENCOUNTER — TELEPHONE (OUTPATIENT)
Dept: OTOLARYNGOLOGY | Facility: CLINIC | Age: 50
End: 2025-07-08
Payer: COMMERCIAL

## 2025-07-08 ENCOUNTER — TELEPHONE (OUTPATIENT)
Dept: AUDIOLOGY | Facility: CLINIC | Age: 50
End: 2025-07-08
Payer: COMMERCIAL

## 2025-07-08 DIAGNOSIS — R42 DIZZINESS AND GIDDINESS: Primary | ICD-10-CM

## 2025-07-08 NOTE — TELEPHONE ENCOUNTER
Positional dizziness world keeps moving for a few seconds after moves head; pressure in both ears; sinus drainage and pain. Dr. Cartwright requested audio and VNG before Ms. De is seen by ENT. Ms. De was scheduled for audio and VNG and a follow up with nurse practitioner Maricel Wylie. Directions for VNG were reviewed and emailed to patient.

## 2025-07-08 NOTE — TELEPHONE ENCOUNTER
Last seen 06/2024.   Pt did not have VNG done.  She is getting dizziness again since 06/18/25.  Do you want to see her or can she have the VNG done now?

## 2025-07-08 NOTE — TELEPHONE ENCOUNTER
Pt was informed. Ita from our office is calling pt to schedule audio/vng and I mailed pt the instructions.

## 2025-07-09 NOTE — TELEPHONE ENCOUNTER
Relayed message to pt on [employer]. Pt read message but did not respond. Please advise if you'd like me to follow up with pt.

## 2025-07-15 ENCOUNTER — APPOINTMENT (OUTPATIENT)
Dept: AUDIOLOGY | Facility: CLINIC | Age: 50
End: 2025-07-15
Payer: COMMERCIAL

## 2025-07-15 DIAGNOSIS — R42 DIZZINESS AND GIDDINESS: Primary | ICD-10-CM

## 2025-07-15 DIAGNOSIS — R42 DIZZINESS AND GIDDINESS: ICD-10-CM

## 2025-07-15 DIAGNOSIS — H93.8X1 SENSATION OF FULLNESS IN RIGHT EAR: Primary | ICD-10-CM

## 2025-07-15 DIAGNOSIS — H93.13 TINNITUS OF BOTH EARS: ICD-10-CM

## 2025-07-15 PROCEDURE — 92550 TYMPANOMETRY & REFLEX THRESH: CPT | Mod: 52 | Performed by: AUDIOLOGIST

## 2025-07-15 PROCEDURE — 92567 TYMPANOMETRY: CPT

## 2025-07-15 PROCEDURE — 92537 CALORIC VSTBLR TEST W/REC: CPT | Mod: 22

## 2025-07-15 PROCEDURE — 92700 UNLISTED ORL SERVICE/PX: CPT

## 2025-07-15 PROCEDURE — 92557 COMPREHENSIVE HEARING TEST: CPT | Performed by: AUDIOLOGIST

## 2025-07-15 PROCEDURE — 92540 BASIC VESTIBULAR EVALUATION: CPT

## 2025-07-15 PROCEDURE — 92519 VEMP TST I&R CERVICAL&OCULAR: CPT

## 2025-07-15 NOTE — PROGRESS NOTES
"Chief Complaint   Patient presents with    Dizziness    Ear Fullness    Tinnitus     AUDIOLOGY AUDIOMETRIC EVALUATION      Name:  Estephania De   :  1975  Age:  50 y.o.  Date of Evaluation:  7/15/2025    Time: 930-949    IMPRESSIONS     Hearing sensitivity within normal limits in both ears.  Word recognition in quiet is excellent in both ears.  Tympanometry indicates normal middle ear pressure and tympanic membrane mobility in both ears.     Today's test results are normal hearing sensitivity.     Amplification needs: Amplification is not warranted at this time.    RECOMMENDATIONS     Follow up with Dr. Cartwright as scheduled  Retest hearing levels in conjunction with otological management    HISTORY     Reason for visit:  Ms. De is seen today for a follow-up audiologic evaluation at the request of Navdeep Cartwright MD.  Ms. De reports dizziness and unsteadiness beginning 2025.  She reports the dizziness is positional in nature (head movement up/down and side to side).  She first experienced the dizziness 2024.  There are no reports of hearing loss, ear pain, middle ear pathology or ear infection.  She does note ear pressure right ear.  She notes periodic tinnitus both ears.  Please see medical records for complete history.    Change in Hearing: denied  Tinnitus: yes in both ears ; intermittent  Otalgia: denied  Aural Pressure/Fullness: yes in the right ear  Recent Ear Infections/Illness: denied  Otorrhea: denied  Dizziness: Movement related  History of Ear Surgeries: denied  History of Noise Exposure: denied  Family History of Hearing Loss: denied  Hearing Aid Use: None  Other Significant History: denied  Falls within the last year: denied    EVALUATION     See scanned audiogram in \"Media\"     TEST RESULTS     Otoscopic Evaluation:  Right Ear: Ear canal clear, tympanic membrane visualized.  Left Ear: Ear canal clear, tympanic membrane visualized.    Tympanometry (226 Hz):  Right " Ear: Type A, middle ear pressure and tympanic membrane compliance within normal limits.   Left Ear: Type A, middle ear pressure and tympanic membrane compliance within normal limits.     Acoustic Reflexes:   Right Ear: (Ipsilateral) Responses present at expected levels for 500-2000 and 500-4000 Hz.  Left Ear: (Ipsilateral) Responses present at expected levels for 500-2000 and 500-4000 Hz.      Test technique:  Pure Tone Audiometry via headphones  Reliability: Good    Pure Tone Audiometry:    Right Ear: Hearing sensitivity within normal limits for 125-8000 Hz.  Left Ear: Hearing sensitivity within normal limits for 125-8000 Hz.    Speech Audiometry:   Right Ear:  Speech Reception Threshold (SRT) was obtained at 5 dB HL. Word Recognition scores were excellent (100%) in quiet when words were presented at 40 dB HL. These results are based on [facility] Auditory Test No.6 (NU-6) Ordered by difficulty (N=10).   Left Ear:  Speech Reception Threshold (SRT) was obtained at 5 dB HL. Word Recognition scores were excellent (100%) in quiet when words were presented at 40 dB HL. These results are based on [facility] Auditory Test No.6 (NU-6) Ordered by difficulty (N=10).     Comparison of today's results with previous test results: No change in hearing since last evaluation on June 2024         PATIENT EDUCATION     Discussed results and recommendations with Ms. De. Questions were addressed and the patient was encouraged to contact our department at [phone] should concerns arise.       Bill Katz, CCC-A  Senior Clinical Audiologist      Degree of   Hearing Sensitivity dB Range   Within Normal Limits (WNL) 0 - 20   Slight 25   Mild 26 - 40   Moderate 41 - 55   Moderately-Severe 56 - 70   Severe 71 - 90   Profound 91 +     Key   CHL Conductive Hearing Loss   ECV Ear Canal Volume   HA Hearing Aid   NIHL Noise-Induced Hearing Loss   PTA Pure Tone Average   SNHL Sensorineural Hearing  Loss   TM Tympanic Membrane   WNL Within Normal Limits

## 2025-07-15 NOTE — PROGRESS NOTES
ADULT BALANCE FUNCTION TEST (BFT)      Name:  Estephania De  :  1975  Age:  50 y.o.  Date of Evaluation:  7/15/2025    IMPRESSIONS     Suspected bilateral peripheral vestibular involvement (left ear worse) given the followin) little to no response for caloric stimuli in the left ear, 2) persistent gaze evoked and positional nystagmus indicative of left ear involvement, 3) abnormal vHIT tracings bilaterally, 4) absent oVEMP responses bilaterally, and 5) abnormal cVEMP responses in the left ear. The vestibular system appears to be uncompensated statically and uncompensated dynamically.     Positive evidence for active Benign Paroxysmal Positional Vertigo (BPPV) given the following:  torsional up-beating nystagmus present in the head left supine position. Indicative of left posterior canalithiasis.    There were no indications of central vestibular system pathway involvement. Normal observation of gait and transfers, however patient is at risk of falling based on today's evaluation and previous history of imbalance with associated falls.     RECOMMENDATIONS     Consider vestibular physical therapy to address uncompensated bilateral vestibulopathy (left ear worse) and Benign Paroxysmal Positional Vertigo (BPPV). Emphasis on sensory substitution exercises to account for inadequate vestibular input, gaze stabilization exercises for VOR deficiencies, habituation exercises to triggers, canalith repositioning maneuvers, and fall risk prevention.  Consider re-evaluation as medically indicated.  Maintain a healthy lifestyle to help body function overall.  Continue monitoring per ENT/PCP preference.    Time: 8423-2321    HISTORY     Patient was seen for Balance Function Testing (BFT) due to a history of dizziness/imbalance. Vestibular case history collected via patient-clinician interview, patient chart review, and patient questionnaires.    Patient reported history of dizziness described as initial  "vertigo/spinning with consistent rocking sensation.  Symptoms began suddenly in June 2024, however returned again June 2025.  Symptoms of rocking sensation are consistent to her.  Associated symptoms include nausea/emesis, increased heat/body temperature, headaches, and veering to both sides while walking.  Symptoms are provoked by head movement up/down and side to side, as well as walking.  Symptoms are alleviated temporarily by chiropractic neck adjustments. Patient has trialed Meclizine without relief of symptoms.  Overall the patient's symptoms have remained the same over time.  This patient has had a total of 0 falls due to their symptoms.   Denied any symptoms provoked by sneezing or coughing, as well as any presence of autophony.   Relevant medical history includes migraines with visual aura, right ear pressure, and left ear tinnitus.  Additionally she has high blood pressure controlled by medications.  Most recent audiologic evaluation performed on 7/15/2025 by Bill Katz CCC-A revealed normal hearing sensitivity, bilaterally. Tympanometry revealed normal eardrum mobility and canal volume, bilaterally.  Patient reported complying with pre-test instructions.     EVALUATION     See Raw Data in \"Media\"    TEST RESULTS     BEDSIDE ASSESSMENT TEST  The bedside assessment is an optional portion of the test battery to further assist in differential diagnosis and screen for eye abnormalities which may affect testing.    Test Results   Otoscopy Clear ear canals.   Tympanometry Normal (Type A).   Extra-Ocular Range of Motion Normal.   Cover/Uncover Abnormal given the presence of exotropia of the left eye. Monocular right eye recordings will be utilized today.   Cervical Neck Exam Normal.   Vertebral Artery Screen Normal.   Ocular Counter-Roll Normal.   VOR Cancellation Normal.       VIDEONYSTAGMOGRAPHY (VNG) TEST  VNG provides objective indications of peripheral and central vestibulo-ocular pathway " involvement. Ocular motor testing to visually guided targets is conducted using a dual channel video-recording technique for the recording of eye movement in the horizontal and vertical planes. Air caloric testing is performed at 48 degrees C and 24 degrees C.     Test Result   Spontaneous Nystagmus Present. Persistent low velocity (2 d/s) right-beating nystagmus. Reduced with fixation light.   Gaze Nystagmus Normal. Of note, persistent low velocity (1 d/s) right-beating nystagmus present in the gaze left without fixation and gaze right without fixation condition(s). Patient did not report symptoms of dizziness. Not clinically significant.   Random Saccade Normal.   Smooth Pursuit/Tracking Normal.   Optokinetic Nystagmus Normal.   Moreno-Hallpike Abnormal given the presence of torsional up-beating nystagmus present in the head left supine position. Nystagmus decayed over time. Patient reported symptoms of dizziness. Reversal seen upon returning to sitting position. Indicative of left posterior canalithiasis.   Roll Normal. Of note, persistent low velocity (2-3 d/s) right-beating nystagmus present in the head left position(s). Nystagmus reduced with fixation light. Patient did not report symptoms of dizziness. Not clinically significant or indicative of BPPV.   Positional Abnormal given the presence of persistent low velocity (2-3 d/s) nystagmus present all position(s). Nystagmus reduced with fixation light. Patient did not report symptoms of dizziness. Given present in over 50% of positions tested - clinically significant finding.   Bithermal Caloric Unilateral weakness of essentially 100%   to the left which is abnormal.    Ice caloric performed on left ear given no measurable nystagmus with air stimulus. Ice water presented for 30 seconds. Results indicated no significant change in pre-caloric nystagmus response. Indicative of little to no residual low frequency vestibular function.    NOTE: Caloric calculations  corrected for pre-caloric nystagmus (3 d/s right-beating).  NOTE: Monocular right eye recording utilized given bedside assessment.      VIDEO HEAD IMPULSE TEST (vHIT)  The vHIT procedure provides objective assessment of the high frequency vestibulo-ocular reflex (VOR) for each semicircular canal. Rapid, random horizontal and vertical thrusts are applied to the patient's head to provoke the VOR. The vHIT procedure includes two separate paradigms: Head Impulse Paradigm (HIMP) and Suppression Head Impulse Paradigm (SHIMP). SHIMP is an optional paradigm that is not appropriate to perform for every patient. However, it is appropriate to perform SHIMP when there is verified evidence of possible vestibulopathy in the traditional HIMP test.     Head Impulse Paradigm (HIMP)   Right Ear   Canal Gain Overt Saccades Covert Saccades   Lateral 0.74 Absent Absent   Anterior 0.58 Absent Absent   Posterior 0.49 Present Absent        Head Impulse Paradigm (HIMP)   Left Ear   Canal Gain Overt Saccades Covert Saccades   Lateral 0.90 Present Absent   Anterior 0.35 Absent Absent   Posterior 0.37 Present Absent     Total gain for right lateral/anterior/posterior and left anterior/posterior canals was below normal limits (<0.80 is abnormal for lateral, <0.70 is abnormal for vertical).  There was evidence of overt saccades in the right posterior and left lateral/posterior canals.      CERVICAL VESTIBULAR EVOKED MYOGENIC POTENTIALS (cVEMP)  The cVEMP procedure is an evoked potential used to test the saccule and its afferent pathway. An asymmetry ratio is utilized to determine side of lesion. The cVEMP was recorded with the patient cervical extension to produce isolated contraction of the ipsilateral sternocleidomastoid (SCM) muscle. The cVEMP was recorded using a 500 Hz tone burst or 1000 Hz tone burst at a rate of 5.1.      Ear Presentation Level Amplitude P1 Latency N1 Latency  Amplitude Asymmetry Ratio   Right 95 dB nHL 1.16 µV 18.00 ms  26.00 ms 33%   Left 95 dB nHL 0.58 µV 19.00 ms 26.00 ms    NOTE: EMG scaling utilized for comparison.    Superior Canal Dehiscence Screening (75 dB nHL): Negative bilaterally    Replicable cVEMP responses were within normal threshold levels bilaterally (> dB nHL is abnormal). Responses were below normal amplitude levels left (outside of  µV is abnormal). The amplitude asymmetry ratio of 33% was borderline significant (>33% is abnormal).        OCULAR VESTIBULAR EVOKED MYOGENIC POTENTIALS (oVEMP)  The oVEMP procedure is an evoked potential used to test the utricle and its afferent pathway. An asymmetry ratio is utilized to determine side of lesion. This is a contralateral recording. The oVEMP was recorded with the patient seated upright with eyes tilted upward to produce isolated contraction of the contralateral inferior oblique muscle. The oVEMP were recorded using a 500 Hz, 1000 Hz, 4000 Hz tone burst at a rate of 5.1.       Ear Presentation Level Amplitude N1 Latency  P1 Latency  Amplitude Asymmetry Ratio   Right NR at limits of equipment. NR NR NR N/A   Left NR at limits of equipment. NR NR NR       Meniere's Disease Screening (1000 Hz): Negative bilaterally  Superior Canal Dehiscence Screening (4000 Hz): Negative bilaterally    Replicable oVEMP responses were absent bilaterally (> dB nHL is abnormal). The amplitude asymmetry ratio could not be calculated (>33% is abnormal).      Raw data reviewed by a member of the Vestibular & Balance Disorders team. Testing and interpretation of results completed by Bill Loya CCC-A ABAC. It was my pleasure to evaluate this patient.     Additional Attendees: Bill Martinez AuD, [facility]  Senior Clinical Vestibular Audiologist

## 2025-07-16 ENCOUNTER — TELEPHONE (OUTPATIENT)
Dept: OTOLARYNGOLOGY | Facility: CLINIC | Age: 50
End: 2025-07-16
Payer: COMMERCIAL

## 2025-07-16 DIAGNOSIS — R42 DIZZINESS AND GIDDINESS: Primary | ICD-10-CM

## 2025-07-16 NOTE — TELEPHONE ENCOUNTER
Scheduling instructions given for vestib rehab. R/S fu appt to September. She is requesting a letter for work - ok to write for her? Just started 2 weeks ago, not eligible for FMLA. She does a lot of walking and lifting/bending. She is unable to drive at this time.   [employer] f- [phone] attn Ashly.

## 2025-07-16 NOTE — TELEPHONE ENCOUNTER
----- Message from Navdeep Cartwright sent at 7/16/2025  9:13 AM EDT -----  This patient has bilateral vestibular weakness and BPPV.  Please schedule her for vestibular therapy.  Please notify her and I will place the order  ----- Message -----  From: MIGEL Loya, CCC-A  Sent: 7/15/2025   3:17 PM EDT  To: Navdeep Cartwright MD

## 2025-07-21 ENCOUNTER — APPOINTMENT (OUTPATIENT)
Dept: PRIMARY CARE | Facility: CLINIC | Age: 50
End: 2025-07-21
Payer: COMMERCIAL

## 2025-07-21 VITALS
BODY MASS INDEX: 50.45 KG/M2 | DIASTOLIC BLOOD PRESSURE: 90 MMHG | OXYGEN SATURATION: 96 % | HEART RATE: 93 BPM | TEMPERATURE: 98.1 F | WEIGHT: 293 LBS | SYSTOLIC BLOOD PRESSURE: 126 MMHG

## 2025-07-21 DIAGNOSIS — E78.00 PURE HYPERCHOLESTEROLEMIA: ICD-10-CM

## 2025-07-21 DIAGNOSIS — I10 PRIMARY HYPERTENSION: Primary | ICD-10-CM

## 2025-07-21 PROCEDURE — 3074F SYST BP LT 130 MM HG: CPT

## 2025-07-21 PROCEDURE — 99214 OFFICE O/P EST MOD 30 MIN: CPT

## 2025-07-21 PROCEDURE — 3080F DIAST BP >= 90 MM HG: CPT

## 2025-07-21 ASSESSMENT — PAIN SCALES - GENERAL: PAINLEVEL_OUTOF10: 0-NO PAIN

## 2025-07-21 NOTE — PROGRESS NOTES
Subjective   Patient ID: Estephania De is a 50 y.o. female who presents for Follow-up (BP/Chol check.).    HPI   HTN: On Lisinopril 40mg daily, hydrochlorothiazide 25mg daily. No medication side effects. Home 's/80's. Dizziness and headaches which she believes is related to vertigo, undergoing treatment currently. Denies chest pain, heart palpitations, SOB, headaches, changes of vision, syncope, leg swelling.     HLD: On no medications. Denies chest pain, SOB, nausea, vomiting, myalgias. Labs not completed prior to visit.   Lab Results   Component Value Date    TRIG 132 12/06/2024    CHOL 206 (H) 12/06/2024    LDLCALC 132 (H) 12/06/2024    HDL 47.9 12/06/2024     The 10-year ASCVD risk score (Jenise BELLAMY, et al., 2019) is: 2.2%    Values used to calculate the score:      Age: 50 years      Clincally relevant sex: Female      Is Non- : No      Diabetic: No      Tobacco smoker: No      Systolic Blood Pressure: 126 mmHg      Is BP treated: Yes      HDL Cholesterol: 47.9 mg/dL      Total Cholesterol: 206 mg/dL    Review of Systems  All other systems have been reviewed and are negative except as noted in the HPI.     Objective   /90 (BP Location: Left arm)   Pulse 93   Temp 36.7 °C (98.1 °F) (Temporal)   Wt 137 kg (301 lb)   SpO2 96%   BMI 50.45 kg/m²     Physical Exam  Vitals and nursing note reviewed.   Constitutional:       General: She is not in acute distress.     Appearance: Normal appearance. She is obese.     Eyes:      Extraocular Movements: Extraocular movements intact.      Conjunctiva/sclera: Conjunctivae normal.     Neck:      Vascular: No carotid bruit.     Cardiovascular:      Rate and Rhythm: Normal rate and regular rhythm.   Pulmonary:      Effort: Pulmonary effort is normal.      Breath sounds: Normal breath sounds.     Musculoskeletal:      Cervical back: Neck supple.      Right lower leg: No edema.      Left lower leg: No edema.     Neurological:       General: No focal deficit present.      Mental Status: She is alert.     Psychiatric:         Mood and Affect: Mood normal.         Assessment/Plan   Assessment & Plan  Primary hypertension  Chronic. Continue Lisinopril 40mg daily, hydrochlorothiazide 25mg daily. DASH diet and 30 minutes of exercise 5x/week. Check home BP and keep log. Recheck in 12/25 at CPE.     Orders:    Albumin-Creatinine Ratio, Urine Random; Future    Pure hypercholesterolemia  Chronic.  Keep off medication at this time.  Labs ordered, will follow-up with results.  Decrease fast food, fried food, processed foods and large amounts of red meat. Increase lean protein, vegetables and exercise. Recheck in 12/25 at CPE.

## 2025-07-21 NOTE — ASSESSMENT & PLAN NOTE
Chronic. Continue Lisinopril 40mg daily, hydrochlorothiazide 25mg daily. DASH diet and 30 minutes of exercise 5x/week. Check home BP and keep log. Recheck in 12/25 at CPE.     Orders:    Albumin-Creatinine Ratio, Urine Random; Future

## 2025-07-29 ENCOUNTER — APPOINTMENT (OUTPATIENT)
Dept: OTOLARYNGOLOGY | Facility: CLINIC | Age: 50
End: 2025-07-29
Payer: COMMERCIAL

## 2025-07-30 ENCOUNTER — APPOINTMENT (OUTPATIENT)
Dept: AUDIOLOGY | Facility: CLINIC | Age: 50
End: 2025-07-30
Payer: COMMERCIAL

## 2025-07-30 DIAGNOSIS — I10 PRIMARY HYPERTENSION: ICD-10-CM

## 2025-07-30 RX ORDER — LISINOPRIL 40 MG/1
40 TABLET ORAL DAILY
Qty: 90 TABLET | Refills: 0 | Status: SHIPPED | OUTPATIENT
Start: 2025-07-30

## 2025-07-30 RX ORDER — HYDROCHLOROTHIAZIDE 25 MG/1
25 TABLET ORAL DAILY
Qty: 90 TABLET | Refills: 0 | Status: SHIPPED | OUTPATIENT
Start: 2025-07-30

## 2025-07-31 ENCOUNTER — EVALUATION (OUTPATIENT)
Dept: PHYSICAL THERAPY | Facility: CLINIC | Age: 50
End: 2025-07-31
Payer: COMMERCIAL

## 2025-07-31 DIAGNOSIS — R42 DIZZINESS AND GIDDINESS: Primary | ICD-10-CM

## 2025-07-31 PROCEDURE — 97162 PT EVAL MOD COMPLEX 30 MIN: CPT | Mod: GP | Performed by: PHYSICAL THERAPIST

## 2025-07-31 SDOH — ECONOMIC STABILITY: GENERAL: QUALITY OF LIFE: GOOD

## 2025-07-31 ASSESSMENT — ENCOUNTER SYMPTOMS: NO PATIENT REPORTED PAIN: 1

## 2025-07-31 ASSESSMENT — ACTIVITIES OF DAILY LIVING (ADL): POOR_BALANCE: 1

## 2025-07-31 NOTE — PROGRESS NOTES
Physical Therapy Evaluation and Treatment     Patient Name: Estephania De  MRN: [id number]  Encounter date: 7/31/2025  Time Calculation  Start Time: 1500  Stop Time: 1535  Time Calculation (min): 35 min  PT Evaluation Time Entry  PT Evaluation (Moderate) Time Entry: 35  Moderate complexity due to patient's clinical presentation being evolving with changing characteristics, with comorbidities/complexities to include BVL, BPPV, all of which may negatively impact rehab tolerance and progression.     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 She has been out of work since June 16th. She reports dizziness when turning her head and when she rolls over. She does not currently drive due to the dizziness. She does have a past history which was exactly one year ago, however at that time it went away on its own after a week. She does not have any dizziness at rest. She does see a chiropractor for her neck which has helped with stiffness but her dizziness is still present. When the dizziness does occur it will only last a few seconds. She does not report any recent N/V with the dizziness currently but did have some initially. She reports that when she went to the ER the dizziness was non-stop and every  motion would make her sick, had difficult walking, this type of dizziness stayed for about a week and a half total before it started to get better. She has gotten exercises from the chiropractor with vestibular type exercises, some of it makes it worse and she is unable to keep her balance.       Quality of life: good    Pain  No pain reported    Social Support  Lives in: multiple-level home (basement; full flight of stairs)  Lives with: spouse    Treatments  Current treatment: chiropractic  Patient Goals  Patient goals for therapy: improved balance  Patient goal: Reduce dizziness         Objective        Cervical/Thoracic Spine    Neuro Oculomotor:  Range of Motion: Normal  Smooth Pursuit: Normal  Horizontal Saccades: Normal  Vertical Saccades: Normal  Eye Alignment : Normal  Distraction Cover: Normal  Distraction Uncover: Normal  Neuro Vestibular:  Horizontal VOR: Positive, Other: (comment) (no nystagmus however instant dizziness)  Vertical VOR: Positive, Other: (comment) (no nystagmus however pt reporting symptoms with cervical extension but not flexion)  R head thrust: Other: (comment) (slight nystagmus with symptoms)  L head thrust: Other: (comment) (slight nystagmus with symptoms)  Spontaneous Nystagmus: Absent  Gaze Evoked Nystagmus: Absent  Positional:  Esko-Halpike Right: Other(comment): (Will test at later date)  Esko-Halpike Left: positive  Horizontal Roll Test Right: Other (comment): (Will test at later date)  Horizontal Roll Test Left: Other (comment): (Will test at later date)    Outcome Measures:  Other Measures  Dizziness Handicap Inventory: 56   Will perform DGI testing at first follow up session     Treatments:   Educated on vestibular system in relation to findings this date    HEP / Access Codes: Will provide at first follow up session     Assessment   Assessment & Plan     Assessment  Impairments: abnormal gait, activity intolerance, impaired balance, impaired physical strength and lacks  appropriate home exercise program  Assessment details: Pt is a 50 y.o female presenting to therapy with dizziness. Pt found to have high sensitivity and dizziness with horizontal VOR however no nystagmus noted this date suggestive of found BVL on VNG testing. Pt also had positive test of L BPPV during testing and good response to epley performed this date. Further testing will be performed at first follow up session due to screen performed this date to prevent any aggravation of symptoms as pt is going to her sons wedding this weekend. At this time pt would benefit from skilled physical therapy in order to prevent further functional decline and reduce fall risk.    Prognosis: good    Goals  Reduce dizziness    Plan  Therapy options: will be seen for skilled physical therapy services  Planned therapy interventions: balance/weight-bearing training, body mechanics training, postural training, strengthening, stretching, functional ROM exercises, gait training, home exercise program, therapeutic activities, transfer training, abdominal trunk stabilization, manual therapy, neuromuscular re-education, flexibility, soft tissue mobilization and fine motor coordination training  Frequency: 1x week  Duration in visits: 8  Duration in weeks: 8  Treatment plan discussed with: patient           Goals:   Active       PT Problem       PT Goal 1       Start:  07/31/25    Expected End:  09/04/25       Pt will be 50% IND with HEP in 4 weeks in order to progress with therapy.  Pt will report a reduction in dizziness score to 0/10 in 4 weeks in order to improve ambulation.  Pt will improve DHI self-reported score to 30 in 4 weeks in order to demonstrate improvement with functional tasks including cooking, dressing, bathing, transfers and reduce fall risk   Pt will improve DGI score to 12/12 in 4 weeks in order to reduce fall risk.          PT Goal 2       Start:  07/31/25    Expected End:  10/09/25       Pt will be 100% IND with HEP in  8 weeks in order to maintain progress with therapy.   Pt will improve DGI score to 24/24 in 8 weeks in order to reduce fall risk.   Pt will report a reduction in dizziness score to 0/10 in 8 weeks in order to improve ambulation, work tasks.   Pt will improve DHI self-reported score to 12 in 8 weeks in order to demonstrate improvement with functional tasks including cooking, dressing, bathing, transfers and driving.

## 2025-08-11 ENCOUNTER — TREATMENT (OUTPATIENT)
Dept: PHYSICAL THERAPY | Facility: CLINIC | Age: 50
End: 2025-08-11
Payer: COMMERCIAL

## 2025-08-11 DIAGNOSIS — R42 DIZZINESS AND GIDDINESS: Primary | ICD-10-CM

## 2025-08-11 PROCEDURE — 97530 THERAPEUTIC ACTIVITIES: CPT | Mod: GP | Performed by: PHYSICAL THERAPIST

## 2025-08-18 ENCOUNTER — TELEPHONE (OUTPATIENT)
Dept: OTOLARYNGOLOGY | Facility: CLINIC | Age: 50
End: 2025-08-18
Payer: COMMERCIAL

## 2025-08-18 ENCOUNTER — TREATMENT (OUTPATIENT)
Dept: PHYSICAL THERAPY | Facility: CLINIC | Age: 50
End: 2025-08-18
Payer: COMMERCIAL

## 2025-08-18 DIAGNOSIS — R42 DIZZINESS AND GIDDINESS: Primary | ICD-10-CM

## 2025-08-18 PROCEDURE — 97112 NEUROMUSCULAR REEDUCATION: CPT | Mod: GP | Performed by: PHYSICAL THERAPIST

## 2025-08-26 ENCOUNTER — TREATMENT (OUTPATIENT)
Facility: CLINIC | Age: 50
End: 2025-08-26
Payer: COMMERCIAL

## 2025-08-26 DIAGNOSIS — R42 DIZZINESS AND GIDDINESS: Primary | ICD-10-CM

## 2025-08-26 PROCEDURE — 97112 NEUROMUSCULAR REEDUCATION: CPT | Mod: GP | Performed by: PHYSICAL THERAPIST

## 2025-09-02 ENCOUNTER — DOCUMENTATION (OUTPATIENT)
Facility: CLINIC | Age: 50
End: 2025-09-02
Payer: COMMERCIAL

## 2025-09-02 DIAGNOSIS — R42 DIZZINESS AND GIDDINESS: Primary | ICD-10-CM

## 2025-09-15 ENCOUNTER — APPOINTMENT (OUTPATIENT)
Dept: OTOLARYNGOLOGY | Facility: CLINIC | Age: 50
End: 2025-09-15
Payer: COMMERCIAL

## 2025-12-12 ENCOUNTER — APPOINTMENT (OUTPATIENT)
Dept: PRIMARY CARE | Facility: CLINIC | Age: 50
End: 2025-12-12
Payer: COMMERCIAL